# Patient Record
Sex: MALE | Race: WHITE | NOT HISPANIC OR LATINO | Employment: OTHER | URBAN - METROPOLITAN AREA
[De-identification: names, ages, dates, MRNs, and addresses within clinical notes are randomized per-mention and may not be internally consistent; named-entity substitution may affect disease eponyms.]

---

## 2023-10-16 ENCOUNTER — TRANSCRIBE ORDERS (OUTPATIENT)
Dept: CARDIAC REHAB | Facility: HOSPITAL | Age: 73
End: 2023-10-16

## 2023-10-16 DIAGNOSIS — J43.9 PULMONARY EMPHYSEMA, UNSPECIFIED EMPHYSEMA TYPE: Primary | ICD-10-CM

## 2023-10-23 ENCOUNTER — OFFICE VISIT (OUTPATIENT)
Dept: CARDIAC REHAB | Facility: HOSPITAL | Age: 73
End: 2023-10-23
Payer: MEDICARE

## 2023-10-23 DIAGNOSIS — J43.9 PULMONARY EMPHYSEMA, UNSPECIFIED EMPHYSEMA TYPE: Primary | ICD-10-CM

## 2023-10-23 PROCEDURE — 94626 PHY/QHP OP PULM RHB W/MNTR: CPT

## 2023-10-23 NOTE — PROGRESS NOTES
Patient was able to complete walk on ra without a rest stop and while maintaining oxygen saturations of 90%. Please see session report for remaining visit details. DESTINY Salazar RRT

## 2023-10-26 ENCOUNTER — TREATMENT (OUTPATIENT)
Dept: CARDIAC REHAB | Facility: HOSPITAL | Age: 73
End: 2023-10-26
Payer: MEDICARE

## 2023-10-26 DIAGNOSIS — J43.9 PULMONARY EMPHYSEMA, UNSPECIFIED EMPHYSEMA TYPE: Primary | ICD-10-CM

## 2023-10-26 PROCEDURE — 94625 PHY/QHP OP PULM RHB W/O MNTR: CPT

## 2023-10-31 ENCOUNTER — TREATMENT (OUTPATIENT)
Dept: CARDIAC REHAB | Facility: HOSPITAL | Age: 73
End: 2023-10-31
Payer: MEDICARE

## 2023-10-31 DIAGNOSIS — J43.9 PULMONARY EMPHYSEMA, UNSPECIFIED EMPHYSEMA TYPE: Primary | ICD-10-CM

## 2023-10-31 PROCEDURE — 94625 PHY/QHP OP PULM RHB W/O MNTR: CPT

## 2023-11-02 ENCOUNTER — TREATMENT (OUTPATIENT)
Dept: CARDIAC REHAB | Facility: HOSPITAL | Age: 73
End: 2023-11-02
Payer: MEDICARE

## 2023-11-02 DIAGNOSIS — J43.9 PULMONARY EMPHYSEMA, UNSPECIFIED EMPHYSEMA TYPE: Primary | ICD-10-CM

## 2023-11-02 PROCEDURE — 94625 PHY/QHP OP PULM RHB W/O MNTR: CPT

## 2023-11-07 ENCOUNTER — TREATMENT (OUTPATIENT)
Dept: CARDIAC REHAB | Facility: HOSPITAL | Age: 73
End: 2023-11-07
Payer: MEDICARE

## 2023-11-07 DIAGNOSIS — J43.9 PULMONARY EMPHYSEMA, UNSPECIFIED EMPHYSEMA TYPE: Primary | ICD-10-CM

## 2023-11-07 PROCEDURE — 94625 PHY/QHP OP PULM RHB W/O MNTR: CPT

## 2023-11-09 ENCOUNTER — TREATMENT (OUTPATIENT)
Dept: CARDIAC REHAB | Facility: HOSPITAL | Age: 73
End: 2023-11-09
Payer: MEDICARE

## 2023-11-09 DIAGNOSIS — J43.9 PULMONARY EMPHYSEMA, UNSPECIFIED EMPHYSEMA TYPE: Primary | ICD-10-CM

## 2023-11-09 PROCEDURE — 94625 PHY/QHP OP PULM RHB W/O MNTR: CPT

## 2023-11-14 ENCOUNTER — TREATMENT (OUTPATIENT)
Dept: CARDIAC REHAB | Facility: HOSPITAL | Age: 73
End: 2023-11-14
Payer: MEDICARE

## 2023-11-14 DIAGNOSIS — J43.9 PULMONARY EMPHYSEMA, UNSPECIFIED EMPHYSEMA TYPE: Primary | ICD-10-CM

## 2023-11-14 PROCEDURE — 94625 PHY/QHP OP PULM RHB W/O MNTR: CPT

## 2023-11-16 ENCOUNTER — TREATMENT (OUTPATIENT)
Dept: CARDIAC REHAB | Facility: HOSPITAL | Age: 73
End: 2023-11-16
Payer: MEDICARE

## 2023-11-16 DIAGNOSIS — J43.9 PULMONARY EMPHYSEMA, UNSPECIFIED EMPHYSEMA TYPE: Primary | ICD-10-CM

## 2023-11-16 PROCEDURE — 94625 PHY/QHP OP PULM RHB W/O MNTR: CPT

## 2023-11-21 ENCOUNTER — APPOINTMENT (OUTPATIENT)
Dept: CARDIAC REHAB | Facility: HOSPITAL | Age: 73
End: 2023-11-21
Payer: MEDICARE

## 2023-11-23 ENCOUNTER — APPOINTMENT (OUTPATIENT)
Dept: CARDIAC REHAB | Facility: HOSPITAL | Age: 73
End: 2023-11-23
Payer: MEDICARE

## 2023-11-28 ENCOUNTER — APPOINTMENT (OUTPATIENT)
Dept: CARDIAC REHAB | Facility: HOSPITAL | Age: 73
End: 2023-11-28
Payer: MEDICARE

## 2023-11-30 ENCOUNTER — APPOINTMENT (OUTPATIENT)
Dept: CARDIAC REHAB | Facility: HOSPITAL | Age: 73
End: 2023-11-30
Payer: MEDICARE

## 2024-03-11 ENCOUNTER — APPOINTMENT (OUTPATIENT)
Dept: GENERAL RADIOLOGY | Facility: HOSPITAL | Age: 74
DRG: 177 | End: 2024-03-11
Payer: OTHER GOVERNMENT

## 2024-03-11 ENCOUNTER — HOSPITAL ENCOUNTER (INPATIENT)
Facility: HOSPITAL | Age: 74
LOS: 4 days | Discharge: HOME | DRG: 177 | End: 2024-03-16
Attending: EMERGENCY MEDICINE | Admitting: HOSPITALIST
Payer: OTHER GOVERNMENT

## 2024-03-11 ENCOUNTER — APPOINTMENT (OUTPATIENT)
Dept: CT IMAGING | Facility: HOSPITAL | Age: 74
DRG: 177 | End: 2024-03-11
Payer: OTHER GOVERNMENT

## 2024-03-11 DIAGNOSIS — J18.9 MULTIFOCAL PNEUMONIA: Primary | ICD-10-CM

## 2024-03-11 DIAGNOSIS — U07.1 COVID-19: ICD-10-CM

## 2024-03-11 LAB
ALBUMIN SERPL-MCNC: 3 G/DL (ref 3.5–5.2)
ALBUMIN/GLOB SERPL: 0.9 G/DL
ALP SERPL-CCNC: 59 U/L (ref 39–117)
ALT SERPL W P-5'-P-CCNC: 9 U/L (ref 1–41)
ANION GAP SERPL CALCULATED.3IONS-SCNC: 9 MMOL/L (ref 5–15)
AST SERPL-CCNC: 15 U/L (ref 1–40)
BASOPHILS # BLD AUTO: 0 10*3/MM3 (ref 0–0.2)
BASOPHILS NFR BLD AUTO: 0.3 % (ref 0–1.5)
BILIRUB SERPL-MCNC: 0.5 MG/DL (ref 0–1.2)
BUN SERPL-MCNC: 26 MG/DL (ref 8–23)
BUN/CREAT SERPL: 27.1 (ref 7–25)
CALCIUM SPEC-SCNC: 8.3 MG/DL (ref 8.6–10.5)
CHLORIDE SERPL-SCNC: 99 MMOL/L (ref 98–107)
CK SERPL-CCNC: 145 U/L (ref 20–200)
CO2 SERPL-SCNC: 27 MMOL/L (ref 22–29)
CREAT SERPL-MCNC: 0.96 MG/DL (ref 0.76–1.27)
D-LACTATE SERPL-SCNC: 0.9 MMOL/L (ref 0.3–2)
DEPRECATED RDW RBC AUTO: 45.5 FL (ref 37–54)
EGFRCR SERPLBLD CKD-EPI 2021: 82.4 ML/MIN/1.73
EOSINOPHIL # BLD AUTO: 0 10*3/MM3 (ref 0–0.4)
EOSINOPHIL NFR BLD AUTO: 0 % (ref 0.3–6.2)
ERYTHROCYTE [DISTWIDTH] IN BLOOD BY AUTOMATED COUNT: 13.5 % (ref 12.3–15.4)
GLOBULIN UR ELPH-MCNC: 3.4 GM/DL
GLUCOSE SERPL-MCNC: 126 MG/DL (ref 65–99)
HCT VFR BLD AUTO: 36.6 % (ref 37.5–51)
HGB BLD-MCNC: 11.9 G/DL (ref 13–17.7)
LYMPHOCYTES # BLD AUTO: 0.3 10*3/MM3 (ref 0.7–3.1)
LYMPHOCYTES NFR BLD AUTO: 1.9 % (ref 19.6–45.3)
MCH RBC QN AUTO: 31.7 PG (ref 26.6–33)
MCHC RBC AUTO-ENTMCNC: 32.5 G/DL (ref 31.5–35.7)
MCV RBC AUTO: 97.6 FL (ref 79–97)
MONOCYTES # BLD AUTO: 0.7 10*3/MM3 (ref 0.1–0.9)
MONOCYTES NFR BLD AUTO: 4 % (ref 5–12)
NEUTROPHILS NFR BLD AUTO: 16.1 10*3/MM3 (ref 1.7–7)
NEUTROPHILS NFR BLD AUTO: 93.8 % (ref 42.7–76)
NRBC BLD AUTO-RTO: 0 /100 WBC (ref 0–0.2)
NT-PROBNP SERPL-MCNC: 872.3 PG/ML (ref 0–1800)
PLATELET # BLD AUTO: 222 10*3/MM3 (ref 140–450)
PMV BLD AUTO: 8.4 FL (ref 6–12)
POTASSIUM SERPL-SCNC: 4.2 MMOL/L (ref 3.5–5.2)
PROCALCITONIN SERPL-MCNC: 1.8 NG/ML (ref 0–0.25)
PROT SERPL-MCNC: 6.4 G/DL (ref 6–8.5)
RBC # BLD AUTO: 3.75 10*6/MM3 (ref 4.14–5.8)
SODIUM SERPL-SCNC: 135 MMOL/L (ref 136–145)
TROPONIN T SERPL HS-MCNC: 11 NG/L
WBC NRBC COR # BLD AUTO: 17.1 10*3/MM3 (ref 3.4–10.8)

## 2024-03-11 PROCEDURE — 84145 PROCALCITONIN (PCT): CPT | Performed by: EMERGENCY MEDICINE

## 2024-03-11 PROCEDURE — 83605 ASSAY OF LACTIC ACID: CPT

## 2024-03-11 PROCEDURE — 83880 ASSAY OF NATRIURETIC PEPTIDE: CPT | Performed by: EMERGENCY MEDICINE

## 2024-03-11 PROCEDURE — 99285 EMERGENCY DEPT VISIT HI MDM: CPT

## 2024-03-11 PROCEDURE — 82550 ASSAY OF CK (CPK): CPT | Performed by: EMERGENCY MEDICINE

## 2024-03-11 PROCEDURE — 94799 UNLISTED PULMONARY SVC/PX: CPT

## 2024-03-11 PROCEDURE — 71045 X-RAY EXAM CHEST 1 VIEW: CPT

## 2024-03-11 PROCEDURE — 0202U NFCT DS 22 TRGT SARS-COV-2: CPT | Performed by: EMERGENCY MEDICINE

## 2024-03-11 PROCEDURE — 36415 COLL VENOUS BLD VENIPUNCTURE: CPT

## 2024-03-11 PROCEDURE — 84484 ASSAY OF TROPONIN QUANT: CPT | Performed by: EMERGENCY MEDICINE

## 2024-03-11 PROCEDURE — 85025 COMPLETE CBC W/AUTO DIFF WBC: CPT | Performed by: EMERGENCY MEDICINE

## 2024-03-11 PROCEDURE — 80053 COMPREHEN METABOLIC PANEL: CPT | Performed by: EMERGENCY MEDICINE

## 2024-03-11 PROCEDURE — 93005 ELECTROCARDIOGRAM TRACING: CPT | Performed by: EMERGENCY MEDICINE

## 2024-03-11 PROCEDURE — 87040 BLOOD CULTURE FOR BACTERIA: CPT | Performed by: EMERGENCY MEDICINE

## 2024-03-11 RX ORDER — SODIUM CHLORIDE 0.9 % (FLUSH) 0.9 %
10 SYRINGE (ML) INJECTION AS NEEDED
Status: DISCONTINUED | OUTPATIENT
Start: 2024-03-11 | End: 2024-03-16 | Stop reason: HOSPADM

## 2024-03-11 RX ORDER — ALBUTEROL SULFATE 2.5 MG/3ML
2.5 SOLUTION RESPIRATORY (INHALATION) ONCE
Status: COMPLETED | OUTPATIENT
Start: 2024-03-11 | End: 2024-03-11

## 2024-03-11 RX ADMIN — ALBUTEROL SULFATE 2.5 MG: 2.5 SOLUTION RESPIRATORY (INHALATION) at 22:52

## 2024-03-12 ENCOUNTER — APPOINTMENT (OUTPATIENT)
Dept: CT IMAGING | Facility: HOSPITAL | Age: 74
DRG: 177 | End: 2024-03-12
Payer: OTHER GOVERNMENT

## 2024-03-12 PROBLEM — M19.90 ARTHRITIS: Status: ACTIVE | Noted: 2024-03-12

## 2024-03-12 PROBLEM — U07.1 COVID: Status: ACTIVE | Noted: 2024-03-12

## 2024-03-12 PROBLEM — G47.30 SLEEP APNEA: Status: ACTIVE | Noted: 2024-03-12

## 2024-03-12 PROBLEM — J18.9 MULTIFOCAL PNEUMONIA: Status: ACTIVE | Noted: 2024-03-12

## 2024-03-12 LAB
ANION GAP SERPL CALCULATED.3IONS-SCNC: 8 MMOL/L (ref 5–15)
B PARAPERT DNA SPEC QL NAA+PROBE: NOT DETECTED
B PERT DNA SPEC QL NAA+PROBE: NOT DETECTED
BACTERIA UR QL AUTO: NORMAL /HPF
BILIRUB UR QL STRIP: NEGATIVE
BUN SERPL-MCNC: 23 MG/DL (ref 8–23)
BUN/CREAT SERPL: 31.1 (ref 7–25)
C PNEUM DNA NPH QL NAA+NON-PROBE: NOT DETECTED
CALCIUM SPEC-SCNC: 8.2 MG/DL (ref 8.6–10.5)
CHLORIDE SERPL-SCNC: 99 MMOL/L (ref 98–107)
CK SERPL-CCNC: 81 U/L (ref 20–200)
CLARITY UR: CLEAR
CO2 SERPL-SCNC: 27 MMOL/L (ref 22–29)
COLOR UR: ABNORMAL
CREAT SERPL-MCNC: 0.74 MG/DL (ref 0.76–1.27)
EGFRCR SERPLBLD CKD-EPI 2021: 94.5 ML/MIN/1.73
FLUAV SUBTYP SPEC NAA+PROBE: NOT DETECTED
FLUBV RNA ISLT QL NAA+PROBE: NOT DETECTED
GEN 5 2HR TROPONIN T REFLEX: 10 NG/L
GLUCOSE SERPL-MCNC: 137 MG/DL (ref 65–99)
GLUCOSE UR STRIP-MCNC: NEGATIVE MG/DL
HADV DNA SPEC NAA+PROBE: NOT DETECTED
HCOV 229E RNA SPEC QL NAA+PROBE: NOT DETECTED
HCOV HKU1 RNA SPEC QL NAA+PROBE: NOT DETECTED
HCOV NL63 RNA SPEC QL NAA+PROBE: NOT DETECTED
HCOV OC43 RNA SPEC QL NAA+PROBE: NOT DETECTED
HGB UR QL STRIP.AUTO: NEGATIVE
HMPV RNA NPH QL NAA+NON-PROBE: NOT DETECTED
HOLD SPECIMEN: NORMAL
HPIV1 RNA ISLT QL NAA+PROBE: NOT DETECTED
HPIV2 RNA SPEC QL NAA+PROBE: NOT DETECTED
HPIV3 RNA NPH QL NAA+PROBE: NOT DETECTED
HPIV4 P GENE NPH QL NAA+PROBE: NOT DETECTED
HYALINE CASTS UR QL AUTO: NORMAL /LPF
KETONES UR QL STRIP: ABNORMAL
L PNEUMO1 AG UR QL IA: NEGATIVE
LEUKOCYTE ESTERASE UR QL STRIP.AUTO: ABNORMAL
M PNEUMO IGG SER IA-ACNC: NOT DETECTED
NITRITE UR QL STRIP: NEGATIVE
PH UR STRIP.AUTO: 5.5 [PH] (ref 5–8)
POTASSIUM SERPL-SCNC: 4.1 MMOL/L (ref 3.5–5.2)
PROT UR QL STRIP: ABNORMAL
QT INTERVAL: 377 MS
QTC INTERVAL: 456 MS
RBC # UR STRIP: NORMAL /HPF
REF LAB TEST METHOD: NORMAL
RHINOVIRUS RNA SPEC NAA+PROBE: NOT DETECTED
RSV RNA NPH QL NAA+NON-PROBE: NOT DETECTED
S PNEUM AG SPEC QL LA: NEGATIVE
SARS-COV-2 RNA NPH QL NAA+NON-PROBE: DETECTED
SODIUM SERPL-SCNC: 134 MMOL/L (ref 136–145)
SP GR UR STRIP: 1.03 (ref 1–1.03)
SQUAMOUS #/AREA URNS HPF: NORMAL /HPF
TROPONIN T DELTA: -1 NG/L
UROBILINOGEN UR QL STRIP: ABNORMAL
WBC # UR STRIP: NORMAL /HPF
WHOLE BLOOD HOLD COAG: NORMAL
WHOLE BLOOD HOLD SPECIMEN: NORMAL

## 2024-03-12 PROCEDURE — 25010000002 DEXAMETHASONE PER 1 MG: Performed by: EMERGENCY MEDICINE

## 2024-03-12 PROCEDURE — 87205 SMEAR GRAM STAIN: CPT | Performed by: NURSE PRACTITIONER

## 2024-03-12 PROCEDURE — 87899 AGENT NOS ASSAY W/OPTIC: CPT | Performed by: NURSE PRACTITIONER

## 2024-03-12 PROCEDURE — 82550 ASSAY OF CK (CPK): CPT | Performed by: NURSE PRACTITIONER

## 2024-03-12 PROCEDURE — 25010000002 CEFTRIAXONE PER 250 MG: Performed by: EMERGENCY MEDICINE

## 2024-03-12 PROCEDURE — 87077 CULTURE AEROBIC IDENTIFY: CPT | Performed by: NURSE PRACTITIONER

## 2024-03-12 PROCEDURE — 81001 URINALYSIS AUTO W/SCOPE: CPT | Performed by: EMERGENCY MEDICINE

## 2024-03-12 PROCEDURE — 87070 CULTURE OTHR SPECIMN AEROBIC: CPT | Performed by: NURSE PRACTITIONER

## 2024-03-12 PROCEDURE — 63710000001 AZATHIOPRINE PER 50 MG: Performed by: INTERNAL MEDICINE

## 2024-03-12 PROCEDURE — 87449 NOS EACH ORGANISM AG IA: CPT | Performed by: NURSE PRACTITIONER

## 2024-03-12 PROCEDURE — 63710000001 MYCOPHENOLATE MOFETIL PER 250 MG: Performed by: INTERNAL MEDICINE

## 2024-03-12 PROCEDURE — 94761 N-INVAS EAR/PLS OXIMETRY MLT: CPT

## 2024-03-12 PROCEDURE — 80048 BASIC METABOLIC PNL TOTAL CA: CPT | Performed by: NURSE PRACTITIONER

## 2024-03-12 PROCEDURE — 94640 AIRWAY INHALATION TREATMENT: CPT

## 2024-03-12 PROCEDURE — 94799 UNLISTED PULMONARY SVC/PX: CPT

## 2024-03-12 PROCEDURE — 84484 ASSAY OF TROPONIN QUANT: CPT | Performed by: EMERGENCY MEDICINE

## 2024-03-12 PROCEDURE — 25010000002 ENOXAPARIN PER 10 MG: Performed by: NURSE PRACTITIONER

## 2024-03-12 PROCEDURE — 63710000001 DEXAMETHASONE PER 0.25 MG: Performed by: NURSE PRACTITIONER

## 2024-03-12 PROCEDURE — 87186 SC STD MICRODIL/AGAR DIL: CPT | Performed by: NURSE PRACTITIONER

## 2024-03-12 PROCEDURE — 70450 CT HEAD/BRAIN W/O DYE: CPT

## 2024-03-12 RX ORDER — MYCOPHENOLATE MOFETIL 500 MG/1
TABLET ORAL 2 TIMES DAILY
COMMUNITY

## 2024-03-12 RX ORDER — AZATHIOPRINE 50 MG/1
50 TABLET ORAL 2 TIMES DAILY
Status: DISCONTINUED | OUTPATIENT
Start: 2024-03-12 | End: 2024-03-16 | Stop reason: HOSPADM

## 2024-03-12 RX ORDER — ALBUTEROL SULFATE 90 UG/1
2 AEROSOL, METERED RESPIRATORY (INHALATION)
Status: DISCONTINUED | OUTPATIENT
Start: 2024-03-12 | End: 2024-03-16 | Stop reason: HOSPADM

## 2024-03-12 RX ORDER — ALBUTEROL SULFATE 90 UG/1
2 AEROSOL, METERED RESPIRATORY (INHALATION) EVERY 6 HOURS PRN
Status: DISCONTINUED | OUTPATIENT
Start: 2024-03-12 | End: 2024-03-12

## 2024-03-12 RX ORDER — DEXAMETHASONE 6 MG/1
6 TABLET ORAL
Status: DISCONTINUED | OUTPATIENT
Start: 2024-03-12 | End: 2024-03-16 | Stop reason: HOSPADM

## 2024-03-12 RX ORDER — ALBUTEROL SULFATE 90 UG/1
2 AEROSOL, METERED RESPIRATORY (INHALATION) EVERY 4 HOURS PRN
COMMUNITY

## 2024-03-12 RX ORDER — NITROGLYCERIN 0.4 MG/1
0.4 TABLET SUBLINGUAL
Status: DISCONTINUED | OUTPATIENT
Start: 2024-03-12 | End: 2024-03-16 | Stop reason: HOSPADM

## 2024-03-12 RX ORDER — GUAIFENESIN 600 MG/1
600 TABLET, EXTENDED RELEASE ORAL EVERY 12 HOURS PRN
Status: DISCONTINUED | OUTPATIENT
Start: 2024-03-12 | End: 2024-03-16 | Stop reason: HOSPADM

## 2024-03-12 RX ORDER — AZATHIOPRINE 50 MG/1
50 TABLET ORAL 2 TIMES DAILY
COMMUNITY

## 2024-03-12 RX ORDER — NIACINAMIDE 500 MG
500 TABLET ORAL 2 TIMES DAILY WITH MEALS
COMMUNITY

## 2024-03-12 RX ORDER — IPRATROPIUM BROMIDE AND ALBUTEROL SULFATE 2.5; .5 MG/3ML; MG/3ML
3 SOLUTION RESPIRATORY (INHALATION)
Status: DISCONTINUED | OUTPATIENT
Start: 2024-03-12 | End: 2024-03-12

## 2024-03-12 RX ORDER — BENZONATATE 100 MG/1
100 CAPSULE ORAL 3 TIMES DAILY PRN
Status: DISCONTINUED | OUTPATIENT
Start: 2024-03-12 | End: 2024-03-16 | Stop reason: HOSPADM

## 2024-03-12 RX ORDER — ALBUTEROL SULFATE 2.5 MG/3ML
2.5 SOLUTION RESPIRATORY (INHALATION) EVERY 6 HOURS PRN
COMMUNITY

## 2024-03-12 RX ORDER — ENOXAPARIN SODIUM 100 MG/ML
30 INJECTION SUBCUTANEOUS DAILY
Status: DISCONTINUED | OUTPATIENT
Start: 2024-03-12 | End: 2024-03-13

## 2024-03-12 RX ORDER — CARVEDILOL 25 MG/1
25 TABLET ORAL 2 TIMES DAILY WITH MEALS
COMMUNITY

## 2024-03-12 RX ORDER — CARVEDILOL 25 MG/1
25 TABLET ORAL 2 TIMES DAILY WITH MEALS
Status: DISCONTINUED | OUTPATIENT
Start: 2024-03-12 | End: 2024-03-16 | Stop reason: HOSPADM

## 2024-03-12 RX ORDER — BUDESONIDE AND FORMOTEROL FUMARATE DIHYDRATE 160; 4.5 UG/1; UG/1
2 AEROSOL RESPIRATORY (INHALATION)
Status: DISCONTINUED | OUTPATIENT
Start: 2024-03-12 | End: 2024-03-16 | Stop reason: HOSPADM

## 2024-03-12 RX ORDER — GUAIFENESIN 600 MG/1
1200 TABLET, EXTENDED RELEASE ORAL EVERY 12 HOURS SCHEDULED
Status: DISCONTINUED | OUTPATIENT
Start: 2024-03-12 | End: 2024-03-16 | Stop reason: HOSPADM

## 2024-03-12 RX ORDER — IPRATROPIUM BROMIDE AND ALBUTEROL SULFATE 2.5; .5 MG/3ML; MG/3ML
3 SOLUTION RESPIRATORY (INHALATION) EVERY 6 HOURS PRN
Status: DISCONTINUED | OUTPATIENT
Start: 2024-03-12 | End: 2024-03-16 | Stop reason: HOSPADM

## 2024-03-12 RX ORDER — MYCOPHENOLATE MOFETIL 250 MG/1
500 CAPSULE ORAL EVERY 12 HOURS SCHEDULED
Status: DISCONTINUED | OUTPATIENT
Start: 2024-03-12 | End: 2024-03-16 | Stop reason: HOSPADM

## 2024-03-12 RX ORDER — ALBUTEROL SULFATE 2.5 MG/3ML
2.5 SOLUTION RESPIRATORY (INHALATION) EVERY 6 HOURS PRN
Status: DISCONTINUED | OUTPATIENT
Start: 2024-03-12 | End: 2024-03-16 | Stop reason: HOSPADM

## 2024-03-12 RX ORDER — FLUTICASONE PROPIONATE AND SALMETEROL 500; 50 UG/1; UG/1
POWDER RESPIRATORY (INHALATION)
COMMUNITY

## 2024-03-12 RX ORDER — BUDESONIDE AND FORMOTEROL FUMARATE DIHYDRATE 160; 4.5 UG/1; UG/1
1 AEROSOL RESPIRATORY (INHALATION)
Status: DISCONTINUED | OUTPATIENT
Start: 2024-03-12 | End: 2024-03-12 | Stop reason: SDUPTHER

## 2024-03-12 RX ORDER — LEFLUNOMIDE 20 MG/1
20 TABLET ORAL DAILY
Status: DISCONTINUED | OUTPATIENT
Start: 2024-03-12 | End: 2024-03-12

## 2024-03-12 RX ORDER — LEFLUNOMIDE 20 MG/1
20 TABLET ORAL DAILY
COMMUNITY

## 2024-03-12 RX ORDER — DEXAMETHASONE SODIUM PHOSPHATE 4 MG/ML
6 INJECTION, SOLUTION INTRA-ARTICULAR; INTRALESIONAL; INTRAMUSCULAR; INTRAVENOUS; SOFT TISSUE ONCE
Status: COMPLETED | OUTPATIENT
Start: 2024-03-12 | End: 2024-03-12

## 2024-03-12 RX ADMIN — GUAIFENESIN 1200 MG: 600 TABLET, EXTENDED RELEASE ORAL at 09:27

## 2024-03-12 RX ADMIN — AZATHIOPRINE 50 MG: 50 TABLET ORAL at 13:33

## 2024-03-12 RX ADMIN — DOXYCYCLINE 100 MG: 100 INJECTION, POWDER, LYOPHILIZED, FOR SOLUTION INTRAVENOUS at 13:32

## 2024-03-12 RX ADMIN — ALBUTEROL SULFATE 2 PUFF: 108 AEROSOL, METERED RESPIRATORY (INHALATION) at 10:58

## 2024-03-12 RX ADMIN — ALBUTEROL SULFATE 2 PUFF: 108 AEROSOL, METERED RESPIRATORY (INHALATION) at 20:29

## 2024-03-12 RX ADMIN — BUDESONIDE AND FORMOTEROL FUMARATE DIHYDRATE 2 PUFF: 160; 4.5 AEROSOL RESPIRATORY (INHALATION) at 20:32

## 2024-03-12 RX ADMIN — CARVEDILOL 25 MG: 6.25 TABLET, FILM COATED ORAL at 21:40

## 2024-03-12 RX ADMIN — ENOXAPARIN SODIUM 30 MG: 100 INJECTION SUBCUTANEOUS at 17:06

## 2024-03-12 RX ADMIN — Medication 5000 UNITS: at 09:27

## 2024-03-12 RX ADMIN — DEXAMETHASONE 6 MG: 6 TABLET ORAL at 09:27

## 2024-03-12 RX ADMIN — MYCOPHENOLATE MOFETIL 500 MG: 250 CAPSULE ORAL at 13:33

## 2024-03-12 RX ADMIN — DEXAMETHASONE SODIUM PHOSPHATE 6 MG: 4 INJECTION, SOLUTION INTRAMUSCULAR; INTRAVENOUS at 02:49

## 2024-03-12 RX ADMIN — MYCOPHENOLATE MOFETIL 500 MG: 250 CAPSULE ORAL at 20:46

## 2024-03-12 RX ADMIN — CARVEDILOL 25 MG: 6.25 TABLET, FILM COATED ORAL at 13:32

## 2024-03-12 RX ADMIN — CEFTRIAXONE 2000 MG: 2 INJECTION, POWDER, FOR SOLUTION INTRAMUSCULAR; INTRAVENOUS at 00:38

## 2024-03-12 RX ADMIN — AZATHIOPRINE 50 MG: 50 TABLET ORAL at 20:46

## 2024-03-12 RX ADMIN — DOXYCYCLINE 100 MG: 100 INJECTION, POWDER, LYOPHILIZED, FOR SOLUTION INTRAVENOUS at 00:37

## 2024-03-12 RX ADMIN — ALBUTEROL SULFATE 2 PUFF: 108 AEROSOL, METERED RESPIRATORY (INHALATION) at 06:33

## 2024-03-12 RX ADMIN — ENOXAPARIN SODIUM 30 MG: 100 INJECTION SUBCUTANEOUS at 02:55

## 2024-03-12 RX ADMIN — GUAIFENESIN 1200 MG: 600 TABLET, EXTENDED RELEASE ORAL at 20:46

## 2024-03-12 NOTE — H&P
[facility] Medicine Services  History & Physical    Patient Name: Dayne Goff  : 1950  MRN: [id number]  Primary Care Physician:  Provider, No Known  Date of admission: 3/11/2024  Date and Time of Service: 3/11/2024 at 0230    Subjective      Chief Complaint: Generalized weakness, fall at home    History of Present Illness: Dayne Goff is a 73 y.o. male with a CMH of COPD who presented to [facility] on 3/11/2024 with COVID, pneumonia.  Patient stated that he was getting out of bed and slipped on the hardwood floor and hit his head. Patient is unsure if he had LOC. Patient was too weak to get up off of the floor for at least 3 hours.  Spouse at bedside stated patient has fallen 3 times in the last month.  Patient developed a productive cough with yellow and green sputum, increased shortness of air, congestion times several days.  [facility] and he was started on azithromycin and steroids.  He denies fever but endorses chills and bodyaches.  His daughter visited from out of state at the end of February and was recently diagnosed with COVID-19.  He denies chest pain, abdominal pain, nausea, vomiting, diarrhea.  Patient stated that he wears 2 L supplemental oxygen per NC at night at home.  He is currently on 2 L per NC.    In the ED, CT head negative.  Chest x-ray showed multifocal pneumonia.  Respiratory panel showed positive COVID-19.  EKG showed sinus rhythm.  All labs unremarkable except procalcitonin 1.8, WBC 17.1.  Patient received nebulizer treatment, Rocephin, Decadron, doxycycline in the ED.  Patient admitted to hospitalist group for further evaluation and treatment.    Review of Systems   Constitutional:  Positive for chills.   HENT: Negative.     Eyes: Negative.    Respiratory:  Positive for cough and shortness of breath.    Cardiovascular: Negative.  Negative for chest pain.   Gastrointestinal: Negative.  Negative for nausea.   Endocrine: Negative.     Genitourinary: Negative.    Musculoskeletal: Negative.    Skin: Negative.    Neurological: Negative.    Psychiatric/Behavioral: Negative.         Personal History     History reviewed. No pertinent past medical history.    History reviewed. No pertinent surgical history.    Family History: family history is not on file. Otherwise pertinent FHx was reviewed and not pertinent to current issue.    Social History:  reports that he has been smoking cigarettes. He started smoking about 50 years ago. He has a 49 pack-year smoking history. He has never used smokeless tobacco. He reports that he does not use drugs.    Home Medications:  Prior to Admission Medications       None              Allergies:  Allergies   Allergen Reactions    Vancomycin Rash       Objective      Vitals:   Temp:  [98.7 °F (37.1 °C)] 98.7 °F (37.1 °C)  Heart Rate:  [80-96] 81  Resp:  [16-20] 20  BP: ()/(64-82) 99/82  Body mass index is 22.24 kg/m².  Physical Exam  Vitals and nursing note reviewed.   Constitutional:       Appearance: Normal appearance.   HENT:      Head: Normocephalic.      Right Ear: External ear normal.      Left Ear: External ear normal.      Nose: Nose normal.      Mouth/Throat:      Pharynx: Oropharynx is clear.   Eyes:      Extraocular Movements: Extraocular movements intact.   Cardiovascular:      Rate and Rhythm: Normal rate and regular rhythm.      Pulses: Normal pulses.      Heart sounds: Normal heart sounds.   Pulmonary:      Effort: Pulmonary effort is normal.      Breath sounds: Wheezing present.   Abdominal:      General: Bowel sounds are normal.      Palpations: Abdomen is soft.   Musculoskeletal:         General: Normal range of motion.      Cervical back: Normal range of motion.   Skin:     General: Skin is warm and dry.      Findings: Lesion present.      Comments: Small scattered scabs to left forearm   Neurological:      Mental Status: He is alert and oriented to person, place, and time.   Psychiatric:          Mood and Affect: Mood normal.         Behavior: Behavior normal.         Diagnostic Data:  Lab Results (last 24 hours)       Procedure Component Value Units Date/Time    Basic Metabolic Panel [[id number]] Updated: 03/12/24 0356    Specimen: Blood     CK [[id number]]  (Normal) Collected: 03/12/24 0257    Specimen: Blood Updated: 03/12/24 0338     Creatine Kinase 81 U/L     High Sensitivity Troponin T 2Hr [[id number]]  (Normal) Collected: 03/12/24 0152    Specimen: Blood Updated: 03/12/24 0216     HS Troponin T 10 ng/L      Troponin T Delta -1 ng/L     Narrative:      High Sensitive Troponin T Reference Range:  <14.0 ng/L- Negative Female for AMI  <22.0 ng/L- Negative Male for AMI  >=14 - Abnormal Female indicating possible myocardial injury.  >=22 - Abnormal Male indicating possible myocardial injury.   Clinicians would have to utilize clinical acumen, EKG, Troponin, and serial changes to determine if it is an Acute Myocardial Infarction or myocardial injury due to an underlying chronic condition.         [place] Draw [[id number]] Collected: 03/11/24 2316    Specimen: Blood Updated: 03/12/24 0030    Narrative:      The following orders were created for panel order [place] Draw.  Procedure                               Abnormality         Status                     ---------                               -----------         ------                     Green Top (Gel)[[id number]]                                  Final result               Lavender Top[[id number]]                                     Final result               Gold Top - SST[[id number]]                                   Final result               Light Blue Top[[id number]]                                   Final result                 Please view results for these tests on the individual orders.    Lavender Top [[id number]] Collected: 03/11/24 2316    Specimen: Blood Updated: 03/12/24 0030     Extra Tube hold for add-on     Comment: Auto resulted       Gold  Top - SST [[id number]] Collected: 03/11/24 2316    Specimen: Blood Updated: 03/12/24 0030     Extra Tube Hold for add-ons.     Comment: Auto resulted.       Light Blue Top [[id number]] Collected: 03/11/24 2316    Specimen: Blood Updated: 03/12/24 0030     Extra Tube Hold for add-ons.     Comment: Auto resulted       Respiratory Panel PCR w/COVID-19(SARS-CoV-2) CLARISA/KUSUM/CK/PAD/COR/KRISTEN In-House, NP Swab in UTM/VTM, 2 HR TAT - Swab, Nasopharynx [[id number]]  (Abnormal) Collected: 03/11/24 2316    Specimen: Swab from Nasopharynx Updated: 03/12/24 0017     ADENOVIRUS, PCR Not Detected     Coronavirus 229E Not Detected     Coronavirus HKU1 Not Detected     Coronavirus NL63 Not Detected     Coronavirus OC43 Not Detected     COVID19 Detected     Human Metapneumovirus Not Detected     Human Rhinovirus/Enterovirus Not Detected     Influenza A PCR Not Detected     Influenza B PCR Not Detected     Parainfluenza Virus 1 Not Detected     Parainfluenza Virus 2 Not Detected     Parainfluenza Virus 3 Not Detected     Parainfluenza Virus 4 Not Detected     RSV, PCR Not Detected     Bordetella pertussis pcr Not Detected     Bordetella parapertussis PCR Not Detected     Chlamydophila pneumoniae PCR Not Detected     Mycoplasma pneumo by PCR Not Detected    Narrative:      In the setting of a positive respiratory panel with a viral infection PLUS a negative procalcitonin without other underlying concern for bacterial infection, consider observing off antibiotics or discontinuation of antibiotics and continue supportive care. If the respiratory panel is positive for atypical bacterial infection (Bordetella pertussis, Chlamydophila pneumoniae, or Mycoplasma pneumoniae), consider antibiotic de-escalation to target atypical bacterial infection.    Green Top (Gel) [[id number]] Collected: 03/11/24 2316    Specimen: Blood Updated: 03/12/24 0000    Blood Culture - Blood, Arm, Right [[id number]] Collected: 03/11/24 4633    Specimen: Blood from Arm,  "Right Updated: 03/11/24 2357    Procalcitonin [[id number]]  (Abnormal) Collected: 03/11/24 2316    Specimen: Blood Updated: 03/11/24 2352     Procalcitonin 1.80 ng/mL     Narrative:      As a Marker for Sepsis (Non-Neonates):    1. <0.5 ng/mL represents a low risk of severe sepsis and/or septic shock.  2. >2 ng/mL represents a high risk of severe sepsis and/or septic shock.    As a Marker for Lower Respiratory Tract Infections that require antibiotic therapy:    PCT on Admission    Antibiotic Therapy       6-12 Hrs later    >0.5                Strongly Recommended  >0.25 - <0.5        Recommended   0.1 - 0.25          Discouraged              Remeasure/reassess PCT  <0.1                Strongly Discouraged     Remeasure/reassess PCT    As 28 day mortality risk marker: \"Change in Procalcitonin Result\" (>80% or <=80%) if Day 0 (or Day 1) and Day 4 values are available. Refer to http://[email or web address]pct-calculator.[email or web address]    Change in PCT <=80%  A decrease of PCT levels below or equal to 80% defines a positive change in PCT test result representing a higher risk for 28-day all-cause mortality of patients diagnosed with severe sepsis for septic shock.    Change in PCT >80%  A decrease of PCT levels of more than 80% defines a negative change in PCT result representing a lower risk for 28-day all-cause mortality of patients diagnosed with severe sepsis or septic shock.       Comprehensive Metabolic Panel [[id number]]  (Abnormal) Collected: 03/11/24 2316    Specimen: Blood Updated: 03/11/24 2349     Glucose 126 mg/dL      BUN 26 mg/dL      Creatinine 0.96 mg/dL      Sodium 135 mmol/L      Potassium 4.2 mmol/L      Chloride 99 mmol/L      CO2 27.0 mmol/L      Calcium 8.3 mg/dL      Total Protein 6.4 g/dL      Albumin 3.0 g/dL      ALT (SGPT) 9 U/L      AST (SGOT) 15 U/L      Alkaline Phosphatase 59 U/L      Total Bilirubin 0.5 mg/dL      Globulin 3.4 gm/dL      A/G Ratio 0.9 g/dL      BUN/Creatinine Ratio 27.1     Anion Gap 9.0 " mmol/L      eGFR 82.4 mL/min/1.73     Narrative:      GFR Normal >60  Chronic Kidney Disease <60  Kidney Failure <15    The GFR formula is only valid for adults with stable renal function between ages 18 and 70.    High Sensitivity Troponin T [[id number]]  (Normal) Collected: 03/11/24 2316    Specimen: Blood Updated: 03/11/24 2349     HS Troponin T 11 ng/L     Narrative:      High Sensitive Troponin T Reference Range:  <14.0 ng/L- Negative Female for AMI  <22.0 ng/L- Negative Male for AMI  >=14 - Abnormal Female indicating possible myocardial injury.  >=22 - Abnormal Male indicating possible myocardial injury.   Clinicians would have to utilize clinical acumen, EKG, Troponin, and serial changes to determine if it is an Acute Myocardial Infarction or myocardial injury due to an underlying chronic condition.         BNP [[id number]]  (Normal) Collected: 03/11/24 2316    Specimen: Blood Updated: 03/11/24 2349     proBNP 872.3 pg/mL     Narrative:      This assay is used as an aid in the diagnosis of individuals suspected of having heart failure. It can be used as an aid in the diagnosis of acute decompensated heart failure (ADHF) in patients presenting with signs and symptoms of ADHF to the emergency department (ED). In addition, NT-proBNP of <300 pg/mL indicates ADHF is not likely.    Age Range Result Interpretation  NT-proBNP Concentration (pg/mL:      <50             Positive            >450                   Gray                 300-450                    Negative             <300    50-75           Positive            >900                  Gray                300-900                  Negative            <300      >75             Positive            >1800                  Gray                300-1800                  Negative            <300    CK [[id number]]  (Normal) Collected: 03/11/24 2316    Specimen: Blood Updated: 03/11/24 2349     Creatine Kinase 145 U/L     POC Lactate [[id number]]  (Normal) Collected:  03/11/24 2320    Specimen: Blood Updated: 03/11/24 2321     Lactate 0.9 mmol/L      Comment: Serial Number: [id number]:  [id number]       CBC & Differential [[id number]]  (Abnormal) Collected: 03/11/24 2316    Specimen: Blood Updated: 03/11/24 2320    Narrative:      The following orders were created for panel order CBC & Differential.  Procedure                               Abnormality         Status                     ---------                               -----------         ------                     CBC Auto Differential[[id number]]        Abnormal            Final result                 Please view results for these tests on the individual orders.    CBC Auto Differential [[id number]]  (Abnormal) Collected: 03/11/24 2316    Specimen: Blood Updated: 03/11/24 2320     WBC 17.10 10*3/mm3      RBC 3.75 10*6/mm3      Hemoglobin 11.9 g/dL      Hematocrit 36.6 %      MCV 97.6 fL      MCH 31.7 pg      MCHC 32.5 g/dL      RDW 13.5 %      RDW-SD 45.5 fl      MPV 8.4 fL      Platelets 222 10*3/mm3      Neutrophil % 93.8 %      Lymphocyte % 1.9 %      Monocyte % 4.0 %      Eosinophil % 0.0 %      Basophil % 0.3 %      Neutrophils, Absolute 16.10 10*3/mm3      Lymphocytes, Absolute 0.30 10*3/mm3      Monocytes, Absolute 0.70 10*3/mm3      Eosinophils, Absolute 0.00 10*3/mm3      Basophils, Absolute 0.00 10*3/mm3      nRBC 0.0 /100 WBC     Blood Culture - Blood, Arm, Right [[id number]] Collected: 03/11/24 2316    Specimen: Blood from Arm, Right Updated: 03/11/24 2317             Imaging Results (Last 24 Hours)       Procedure Component Value Units Date/Time    CT Head Without Contrast [[id number]] Collected: 03/12/24 0149     Updated: 03/12/24 0151    Narrative:      CT HEAD WO CONTRAST    Date of Exam: 3/12/2024 1:44 AM EDT    Indication: trauma.    Comparison: 11/1/2018.    Technique: Axial CT images were obtained of the head without contrast administration.  Coronal reconstructions were performed.  Automated exposure  control and iterative reconstruction methods were used.      Findings:  There is no evidence of hemorrhage. There is no mass effect or midline shift.    There is no extracerebral collection.    Ventricles are normal in size and configuration for patient's stated age.      Posterior fossa is within normal limits.    Calvarium and skull base appear intact.   Visualized sinuses show no air fluid levels. Visualized orbits are unremarkable.      Impression:      Impression:  No acute intracranial process identified.        Electronically Signed: Bibiana Abad MD    3/12/2024 1:49 AM EDT    Workstation ID: [id number]    XR Chest 1 View [[id number]] Collected: 03/11/24 2340     Updated: 03/11/24 2343    Narrative:      XR CHEST 1 VW    Date of Exam: 3/11/2024 11:32 PM EDT    Indication: soa    Comparison: 11/4/2018.    Findings:  The heart appears mildly enlarged, stable as compared to the previous study. No sizable pleural effusion identified. Patchy airspace disease is seen within the left upper lobe and the bilateral mid lung region. Some of this is related to scarring though   increased as compared to the previous study. New patchy airspace T8 disease is also present within the right upper lobe. No pneumothorax. Emphysematous changes are present.      Impression:      Impression:  Multifocal pneumonia. Follow-up to resolution is recommended to exclude underlying malignancy.     Electronically Signed: Bibiana Abad MD    3/11/2024 11:41 PM EDT    Workstation ID: [id number]              Assessment & Plan        This is a 73 y.o. male with:    Active and Resolved Problems  Active Hospital Problems    Diagnosis  POA    **COVID [U07.1]  Yes    Multifocal pneumonia [J18.9]  Unknown      Resolved Hospital Problems   No resolved problems to display.     Pneumonia  COVID-19  COPD exacerbation  -Imaging reviewed  -Procalcitonin 1.8, WBC 17.1  -Lactic acid WNL  -Rocephin and doxycycline given in the ED, continue  -Urine for strep pneumo  and Legionella ordered  -Blood cultures   -Sputum culture  -Nebulizer treatments, steroids, cough meds ordered  -Consider pulmonology consult    DVT prophylaxis:  Medical DVT prophylaxis orders are present.    The patient desires to be as follows:    CODE STATUS:    Code Status (Patient has no pulse and is not breathing): CPR (Attempt to Resuscitate)  Medical Interventions (Patient has pulse or is breathing): Full Support    Admission Status:  I believe this patient meets inpatient status.    Expected Length of Stay: greater than 2 nights    PDMP and Medication Dispenses via Sidebar reviewed and consistent with patient reported medications.    I discussed the patient's findings and my recommendations with patient and family.      Signature:     This document has been electronically signed by SUNITA Maguire on March 12, 2024 04:12 EDT   [facility] Hospitalist Team

## 2024-03-12 NOTE — CASE MANAGEMENT/SOCIAL WORK
Discharge Planning Assessment   Carlos     Patient Name: Dayne Goff  MRN: [id number]  Today's Date: 3/12/2024    Admit Date: 3/11/2024    Plan: DC PLAN: From routine home with wife. Current 02 2L at night. PT eval pending.       Discharge Needs Assessment       Row Name 03/12/24 1241       Living Environment    People in Home spouse    Current Living Arrangements home    Potentially Unsafe Housing Conditions none    In the past 12 months has the electric, gas, oil, or water company threatened to shut off services in your home? No    Primary Care Provided by self    Provides Primary Care For no one    Family Caregiver if Needed spouse    Quality of Family Relationships helpful;involved;supportive    Able to Return to Prior Arrangements yes       Resource/Environmental Concerns    Resource/Environmental Concerns none    Transportation Concerns none       Transportation Needs    In the past 12 months, has lack of transportation kept you from medical appointments or from getting medications? no    In the past 12 months, has lack of transportation kept you from meetings, work, or from getting things needed for daily living? No       Food Insecurity    Within the past 12 months, you worried that your food would run out before you got the money to buy more. Never true    Within the past 12 months, the food you bought just didn't last and you didn't have money to get more. Never true       Transition Planning    Patient/Family Anticipates Transition to home with family    Patient/Family Anticipated Services at Transition none    Transportation Anticipated car, drives self;family or friend will provide       Discharge Needs Assessment    Readmission Within the Last 30 Days no previous admission in last 30 days    Equipment Currently Used at Home bp cuff;oxygen;respiratory supplies    Anticipated Changes Related to Illness none    Equipment Needed After Discharge none                   Discharge Plan       Row Name  03/12/24 1241       Plan    Plan DC PLAN: From routine home with wife. Current 02 2L at night. PT eval pending.    Plan Comments Met with patient at bedside, from routine home with wife. Independent with ADL's no DME other than [facility]L [facility] Agreeagable to use [facility]. Able to afford medications and denies any issues with food or utilities. Denies any transportation issues, still drives and wife will provide at time of discharge. Denies any HHC or SNF needs, but pending PT eval. Denies any concerns about return home.                  Continued Care and Services - Admitted Since 3/11/2024    No active coordination exists for this encounter.       Expected Discharge Date and Time       Expected Discharge Date Expected Discharge Time    Mar 13, 2024            Demographic Summary       Row Name 03/12/24 1240       General Information    Admission Type inpatient    Arrived From emergency department    Required Notices Provided Important Message from Medicare    Referral Source admission list    Reason for Consult discharge planning    Preferred Language English       Contact Information    Permission Granted to Share Info With     Contact Information Obtained for                    Functional Status       Row Name 03/12/24 1241       Functional Status    Usual Activity Tolerance excellent    Current Activity Tolerance excellent       Physical Activity    On average, how many days per week do you engage in moderate to strenuous exercise (like a brisk walk)? 0 days    On average, how many minutes do you engage in exercise at this level? 0 min    Number of minutes of exercise per week 0       Assessment of Health Literacy    How often do you have someone help you read hospital materials? Sometimes    How often do you have problems learning about your medical condition because of difficulty understanding written information? Sometimes    How  often do you have a problem understanding what is told to you about your medical condition? Sometimes    How confident are you filling out medical forms by yourself? Somewhat    Health Literacy Moderate       Functional Status, IADL    Medications independent    Meal Preparation independent    Housekeeping independent    Laundry independent    Shopping independent       Mental Status    General Appearance WDL WDL       Mental Status Summary    Recent Changes in Mental Status/Cognitive Functioning no changes                    Met with patient in room wearing PPE: mask, face shield/goggles, gloves, gown.      Maintained distance greater than six feet and spent less than 15 minutes in the room.  Flory Myers, DARIUSZ   Case Management

## 2024-03-12 NOTE — PROGRESS NOTES
[facility] Medicine Services  History & Physical     Patient Name: Dayne Goff  : 1950  MRN: [id number]  Primary Care Physician:  Provider, No Known  Date of admission: 3/11/2024  Date and Time of Service: 3/11/2024 at 0230     Subjective       Chief Complaint: Generalized weakness, fall at home     History of Present Illness: Dayne Goff is a 73 y.o. male with a CMH of COPD who presented to [facility] on 3/11/2024 with COVID, pneumonia.  Patient stated that he was getting out of bed and slipped on the hardwood floor and hit his head. Patient is unsure if he had LOC. Patient was too weak to get up off of the floor for at least 3 hours.  Spouse at bedside stated patient has fallen 3 times in the last month.  Patient developed a productive cough with yellow and green sputum, increased shortness of air, congestion times several days.  [facility] and he was started on azithromycin and steroids.  He denies fever but endorses chills and bodyaches.  His daughter visited from out of state at the end of February and was recently diagnosed with COVID-19.  He denies chest pain, abdominal pain, nausea, vomiting, diarrhea.  Patient stated that he wears 2 L supplemental oxygen per NC at night at home.  He is currently on 2 L per NC.     In the ED, CT head negative.  Chest x-ray showed multifocal pneumonia.  Respiratory panel showed positive COVID-19.  EKG showed sinus rhythm.  All labs unremarkable except procalcitonin 1.8, WBC 17.1.  Patient received nebulizer treatment, Rocephin, Decadron, doxycycline in the ED.  Patient admitted to hospitalist group for further evaluation and treatment.     Review of Systems   Constitutional:  Positive for chills.   HENT: Negative.     Eyes: Negative.    Respiratory:  Positive for cough and shortness of breath.    Cardiovascular: Negative.  Negative for chest pain.   Gastrointestinal: Negative.  Negative for nausea.   Endocrine: Negative.     Genitourinary: Negative.    Musculoskeletal: Negative.    Skin: Negative.    Neurological: Negative.    Psychiatric/Behavioral: Negative.       3/12  Patient admitted early this morning  Patient had a fall event  On room air right now  Patient with COPD with 6 COVID and pneumonia on the chest x-ray  Pulmonary will be consulted for further recommendation        Personal History      Medical History   History reviewed. No pertinent past medical history.        Surgical History   History reviewed. No pertinent surgical history.        Family History: family history is not on file. Otherwise pertinent FHx was reviewed and not pertinent to current issue.     Social History:  reports that he has been smoking cigarettes. He started smoking about 50 years ago. He has a 49 pack-year smoking history. He has never used smokeless tobacco. He reports that he does not use drugs.     Home Medications:  Prior to Admission Medications         None                   Allergies:  Allergies        Allergies   Allergen Reactions    Vancomycin Rash            Objective       Vitals:   Temp:  [98.7 °F (37.1 °C)] 98.7 °F (37.1 °C)  Heart Rate:  [80-96] 81  Resp:  [16-20] 20  BP: ()/(64-82) 99/82  Body mass index is 22.24 kg/m².  Physical Exam  Vitals and nursing note reviewed.   Constitutional:       Appearance: Normal appearance.   HENT:      Head: Normocephalic.      Right Ear: External ear normal.      Left Ear: External ear normal.      Nose: Nose normal.      Mouth/Throat:      Pharynx: Oropharynx is clear.   Eyes:      Extraocular Movements: Extraocular movements intact.   Cardiovascular:      Rate and Rhythm: Normal rate and regular rhythm.      Pulses: Normal pulses.      Heart sounds: Normal heart sounds.   Pulmonary:      Effort: Pulmonary effort is normal.      Breath sounds: Wheezing present.   Abdominal:      General: Bowel sounds are normal.      Palpations: Abdomen is soft.   Musculoskeletal:         General: Normal  range of motion.      Cervical back: Normal range of motion.   Skin:     General: Skin is warm and dry.      Findings: Lesion present.      Comments: Small scattered scabs to left forearm   Neurological:      Mental Status: He is alert and oriented to person, place, and time.   Psychiatric:         Mood and Affect: Mood normal.         Behavior: Behavior normal.            Diagnostic Data:  Lab Results (last 24 hours)         Procedure Component Value Units Date/Time     Basic Metabolic Panel [[id number]] Updated: 03/12/24 0356     Specimen: Blood       CK [[id number]]  (Normal) Collected: 03/12/24 0257     Specimen: Blood Updated: 03/12/24 0338       Creatine Kinase 81 U/L       High Sensitivity Troponin T 2Hr [[id number]]  (Normal) Collected: 03/12/24 0152     Specimen: Blood Updated: 03/12/24 0216       HS Troponin T 10 ng/L         Troponin T Delta -1 ng/L       Narrative:       High Sensitive Troponin T Reference Range:  <14.0 ng/L- Negative Female for AMI  <22.0 ng/L- Negative Male for AMI  >=14 - Abnormal Female indicating possible myocardial injury.  >=22 - Abnormal Male indicating possible myocardial injury.   Clinicians would have to utilize clinical acumen, EKG, Troponin, and serial changes to determine if it is an Acute Myocardial Infarction or myocardial injury due to an underlying chronic condition.           [place] Draw [[id number]] Collected: 03/11/24 2316     Specimen: Blood Updated: 03/12/24 0030     Narrative:       The following orders were created for panel order [place] Draw.  Procedure                               Abnormality         Status                     ---------                               -----------         ------                     Green Top (Gel)[[id number]]                                  Final result               Lavender Top[[id number]]                                     Final result               Gold Top - SST[[id number]]                                   Final result                Light Blue Top[[id number]]                                   Final result                  Please view results for these tests on the individual orders.     Lavender Top [[id number]] Collected: 03/11/24 2316     Specimen: Blood Updated: 03/12/24 0030       Extra Tube hold for add-on       Comment: Auto resulted        Gold Top - SST [[id number]] Collected: 03/11/24 2316     Specimen: Blood Updated: 03/12/24 0030       Extra Tube Hold for add-ons.       Comment: Auto resulted.        Light Blue Top [[id number]] Collected: 03/11/24 2316     Specimen: Blood Updated: 03/12/24 0030       Extra Tube Hold for add-ons.       Comment: Auto resulted        Respiratory Panel PCR w/COVID-19(SARS-CoV-2) CLARISA/KUSUM/CK/PAD/COR/KRISTEN In-House, NP Swab in UTM/VTM, 2 HR TAT - Swab, Nasopharynx [[id number]]  (Abnormal) Collected: 03/11/24 2316     Specimen: Swab from Nasopharynx Updated: 03/12/24 0017       ADENOVIRUS, PCR Not Detected       Coronavirus 229E Not Detected       Coronavirus HKU1 Not Detected       Coronavirus NL63 Not Detected       Coronavirus OC43 Not Detected       COVID19 Detected       Human Metapneumovirus Not Detected       Human Rhinovirus/Enterovirus Not Detected       Influenza A PCR Not Detected       Influenza B PCR Not Detected       Parainfluenza Virus 1 Not Detected       Parainfluenza Virus 2 Not Detected       Parainfluenza Virus 3 Not Detected       Parainfluenza Virus 4 Not Detected       RSV, PCR Not Detected       Bordetella pertussis pcr Not Detected       Bordetella parapertussis PCR Not Detected       Chlamydophila pneumoniae PCR Not Detected       Mycoplasma pneumo by PCR Not Detected     Narrative:       In the setting of a positive respiratory panel with a viral infection PLUS a negative procalcitonin without other underlying concern for bacterial infection, consider observing off antibiotics or discontinuation of antibiotics and continue supportive care. If the respiratory panel is positive for  "atypical bacterial infection (Bordetella pertussis, Chlamydophila pneumoniae, or Mycoplasma pneumoniae), consider antibiotic de-escalation to target atypical bacterial infection.     Green Top (Gel) [[id number]] Collected: 03/11/24 2316     Specimen: Blood Updated: 03/12/24 0000     Blood Culture - Blood, Arm, Right [[id number]] Collected: 03/11/24 2354     Specimen: Blood from Arm, Right Updated: 03/11/24 2357     Procalcitonin [[id number]]  (Abnormal) Collected: 03/11/24 2316     Specimen: Blood Updated: 03/11/24 2352       Procalcitonin 1.80 ng/mL       Narrative:       As a Marker for Sepsis (Non-Neonates):     1. <0.5 ng/mL represents a low risk of severe sepsis and/or septic shock.  2. >2 ng/mL represents a high risk of severe sepsis and/or septic shock.     As a Marker for Lower Respiratory Tract Infections that require antibiotic therapy:     PCT on Admission    Antibiotic Therapy       6-12 Hrs later     >0.5                Strongly Recommended  >0.25 - <0.5        Recommended   0.1 - 0.25          Discouraged              Remeasure/reassess PCT  <0.1                Strongly Discouraged     Remeasure/reassess PCT     As 28 day mortality risk marker: \"Change in Procalcitonin Result\" (>80% or <=80%) if Day 0 (or Day 1) and Day 4 values are available. Refer to http://www.[facility]-pct-calculator.com     Change in PCT <=80%  A decrease of PCT levels below or equal to 80% defines a positive change in PCT test result representing a higher risk for 28-day all-cause mortality of patients diagnosed with severe sepsis for septic shock.     Change in PCT >80%  A decrease of PCT levels of more than 80% defines a negative change in PCT result representing a lower risk for 28-day all-cause mortality of patients diagnosed with severe sepsis or septic shock.         Comprehensive Metabolic Panel [[id number]]  (Abnormal) Collected: 03/11/24 2316     Specimen: Blood Updated: 03/11/24 2349       Glucose 126 mg/dL         BUN 26 " mg/dL         Creatinine 0.96 mg/dL         Sodium 135 mmol/L         Potassium 4.2 mmol/L         Chloride 99 mmol/L         CO2 27.0 mmol/L         Calcium 8.3 mg/dL         Total Protein 6.4 g/dL         Albumin 3.0 g/dL         ALT (SGPT) 9 U/L         AST (SGOT) 15 U/L         Alkaline Phosphatase 59 U/L         Total Bilirubin 0.5 mg/dL         Globulin 3.4 gm/dL         A/G Ratio 0.9 g/dL         BUN/Creatinine Ratio 27.1       Anion Gap 9.0 mmol/L         eGFR 82.4 mL/min/1.73       Narrative:       GFR Normal >60  Chronic Kidney Disease <60  Kidney Failure <15     The GFR formula is only valid for adults with stable renal function between ages 18 and 70.     High Sensitivity Troponin T [[id number]]  (Normal) Collected: 03/11/24 2316     Specimen: Blood Updated: 03/11/24 2349       HS Troponin T 11 ng/L       Narrative:       High Sensitive Troponin T Reference Range:  <14.0 ng/L- Negative Female for AMI  <22.0 ng/L- Negative Male for AMI  >=14 - Abnormal Female indicating possible myocardial injury.  >=22 - Abnormal Male indicating possible myocardial injury.   Clinicians would have to utilize clinical acumen, EKG, Troponin, and serial changes to determine if it is an Acute Myocardial Infarction or myocardial injury due to an underlying chronic condition.           BNP [[id number]]  (Normal) Collected: 03/11/24 2316     Specimen: Blood Updated: 03/11/24 2349       proBNP 872.3 pg/mL       Narrative:       This assay is used as an aid in the diagnosis of individuals suspected of having heart failure. It can be used as an aid in the diagnosis of acute decompensated heart failure (ADHF) in patients presenting with signs and symptoms of ADHF to the emergency department (ED). In addition, NT-proBNP of <300 pg/mL indicates ADHF is not likely.     Age Range        Result Interpretation  NT-proBNP Concentration (pg/mL:        <50             Positive            >450                        Reddy                         300-450                          Negative                        <300     50-75           Positive            >900                  Gray                300-900                  Negative            <300        >75             Positive            >1800                  Gray                300-1800                  Negative            <300     CK [[id number]]  (Normal) Collected: 03/11/24 2316     Specimen: Blood Updated: 03/11/24 2349       Creatine Kinase 145 U/L       POC Lactate [[id number]]  (Normal) Collected: 03/11/24 2320     Specimen: Blood Updated: 03/11/24 2321       Lactate 0.9 mmol/L         Comment: Serial Number: [id number]:  [id number]        CBC & Differential [[id number]]  (Abnormal) Collected: 03/11/24 2316     Specimen: Blood Updated: 03/11/24 2320     Narrative:       The following orders were created for panel order CBC & Differential.  Procedure                               Abnormality         Status                     ---------                               -----------         ------                     CBC Auto Differential[[id number]]        Abnormal            Final result                  Please view results for these tests on the individual orders.     CBC Auto Differential [[id number]]  (Abnormal) Collected: 03/11/24 2316     Specimen: Blood Updated: 03/11/24 2320       WBC 17.10 10*3/mm3         RBC 3.75 10*6/mm3         Hemoglobin 11.9 g/dL         Hematocrit 36.6 %         MCV 97.6 fL         MCH 31.7 pg         MCHC 32.5 g/dL         RDW 13.5 %         RDW-SD 45.5 fl         MPV 8.4 fL         Platelets 222 10*3/mm3         Neutrophil % 93.8 %         Lymphocyte % 1.9 %         Monocyte % 4.0 %         Eosinophil % 0.0 %         Basophil % 0.3 %         Neutrophils, Absolute 16.10 10*3/mm3         Lymphocytes, Absolute 0.30 10*3/mm3         Monocytes, Absolute 0.70 10*3/mm3         Eosinophils, Absolute 0.00 10*3/mm3         Basophils, Absolute 0.00 10*3/mm3         nRBC 0.0 /100  WBC       Blood Culture - Blood, Arm, Right [[id number]] Collected: 03/11/24 2316     Specimen: Blood from Arm, Right Updated: 03/11/24 2317                           Imaging Results (Last 24 Hours)         Procedure Component Value Units Date/Time     CT Head Without Contrast [[id number]] Collected: 03/12/24 0149       Updated: 03/12/24 0151     Narrative:       CT HEAD WO CONTRAST     Date of Exam: 3/12/2024 1:44 AM EDT     Indication: trauma.     Comparison: 11/1/2018.     Technique: Axial CT images were obtained of the head without contrast administration.  Coronal reconstructions were performed.  Automated exposure control and iterative reconstruction methods were used.        Findings:  There is no evidence of hemorrhage. There is no mass effect or midline shift.     There is no extracerebral collection.     Ventricles are normal in size and configuration for patient's stated age.       Posterior fossa is within normal limits.     Calvarium and skull base appear intact.   Visualized sinuses show no air fluid levels. Visualized orbits are unremarkable.        Impression:       Impression:  No acute intracranial process identified.           Electronically Signed: Bibiana Abad MD    3/12/2024 1:49 AM EDT    Workstation ID: [id number]     XR Chest 1 View [[id number]] Collected: 03/11/24 2340       Updated: 03/11/24 2343     Narrative:       XR CHEST 1 VW     Date of Exam: 3/11/2024 11:32 PM EDT     Indication: soa     Comparison: 11/4/2018.     Findings:  The heart appears mildly enlarged, stable as compared to the previous study. No sizable pleural effusion identified. Patchy airspace disease is seen within the left upper lobe and the bilateral mid lung region. Some of this is related to scarring though   increased as compared to the previous study. New patchy airspace T8 disease is also present within the right upper lobe. No pneumothorax. Emphysematous changes are present.        Impression:        Impression:  Multifocal pneumonia. Follow-up to resolution is recommended to exclude underlying malignancy.      Electronically Signed: Bibiana Abad MD    3/11/2024 11:41 PM EDT    Workstation ID: [id number]                   Assessment & Plan          This is a 73 y.o. male with:     Active and Resolved Problems        Active Hospital Problems     Diagnosis   POA    **COVID [U07.1]   Yes    Multifocal pneumonia [J18.9]   Unknown       Resolved Hospital Problems   No resolved problems to display.      Pneumonia  COVID-19  COPD exacerbation  -Imaging reviewed  -Procalcitonin 1.8, WBC 17.1  -Lactic acid WNL  -Rocephin and doxycycline given in the ED, continue  -Urine for strep pneumo and Legionella ordered  -Blood cultures   -Sputum culture  -Nebulizer treatments, steroids, cough meds ordered  -Consider pulmonology consult     DVT prophylaxis:  Medical DVT prophylaxis orders are present.     The patient desires to be as follows:     CODE STATUS:    Code Status (Patient has no pulse and is not breathing): CPR (Attempt to Resuscitate)  Medical Interventions (Patient has pulse or is breathing): Full Support     Admission Status:  I believe this patient meets inpatient status.     Expected Length of Stay: greater than 2 nights     PDMP and Medication Dispenses via Sidebar reviewed and consistent with patient reported medications.     I discussed the patient's findings and my recommendations with patient and family.

## 2024-03-12 NOTE — CONSULTS
[facility]: [facility]         CONSULT NOTE    Patient Identification:  Dayne Goff  73 y.o.  male  1950  [id number]            Requesting physician: Attending physician    Reason for Consultation: COVID-pneumonia, COPD      History of Present Illness:  73-year-old male with history of COPD and chronic hypoxemia on 2 L of oxygen at home who presented 3/11/2024 after sustaining a fall at home.  He reports productive cough for several days with the sputum color being yellow to green, shortness of breath and congestion.  He was treated in an outside facility with oral azithromycin and steroids.  Daughter was reported to have recent diagnosis of COVID-19.  Patient is afebrile and hemodynamically stable, oxygen saturation 92% on 2 L of oxygen.  WBC 17.1, hemoglobin 11.9, procalcitonin 1.8.  Chest x-ray shows multifocal pneumonia    Assessment:  COVID-19 infection  Multifocal pneumonia  COPD with acute exacerbation  Chronic hypoxemia  Leukocytosis    Chest x-ray 3/11/2024 shows multifocal pneumonia, CT scan from 2018 shows severe emphysema and scarring in the lower lobes bilaterally.      Recommendations:  Antibiotics: Rocephin  Oxygen supplement and titration to maintain saturation 90 to 95%: Currently requiring 2 L per nasal cannula  Bronchodilators  Inhaled corticosteroids: Symbicort  Mucinex  Vitamin D  DVT prophylaxis    I personally reviewed the radiological studies      Review of Sytems:  Constitutional: Positive for for chills, and negative fever and positive for malaise/fatigue.   HENT: Negative.    Eyes: Negative.    Cardiovascular: Negative.    Respiratory: Positive for cough and shortness of breath.    Skin: Negative.    Musculoskeletal: Negative.    Gastrointestinal: Negative.    Genitourinary: Negative.    Neurological: Negative.    Psychiatric/Behavioral: Negative.    Past Medical History:  History reviewed. No pertinent past medical history.    Past Surgical History:  History reviewed.  "No pertinent surgical history.     Home Meds:  (Not in a hospital admission)      Allergies:  Allergies   Allergen Reactions    Vancomycin Rash       Social History:   Social History     Socioeconomic History    Marital status:    Tobacco Use    Smoking status: Some Days     Current packs/day: 0.25     Average packs/day: 1 pack/day for 49.2 years (49.0 ttl pk-yrs)     Types: Cigarettes     Start date: 1974     Last attempt to quit: 2023    Smokeless tobacco: Never   Vaping Use    Vaping status: Never Used   Substance and Sexual Activity    Drug use: Never    Sexual activity: Defer       Family History:  History reviewed. No pertinent family history.    Physical Exam:  BP 95/60   Pulse 81   Temp 98.7 °F (37.1 °C) (Oral)   Resp 20   Ht 177.8 cm (70\")   Wt 70.3 kg (155 lb)   SpO2 92%   BMI 22.24 kg/m²  Body mass index is 22.24 kg/m². 92% 70.3 kg (155 lb)  General Appearance:  Alert   HEENT:  Normocephalic, without obvious abnormality, Conjunctiva/corneas clear,.   Nares normal, no drainage     Neck:  Supple, symmetrical, trachea midline. No JVD.  Lungs /Chest wall:   Bilateral basal rhonchi, respirations unlabored, symmetrical wall movement.     Heart:  Regular rate and rhythm, S1 S2 normal  Abdomen: Soft, non-tender, no masses, no organomegaly.    Extremities: No edema, no clubbing or cyanosis    LABS:  Lab Results   Component Value Date    CALCIUM 8.3 (L) 03/11/2024     Results from last 7 days   Lab Units 03/11/24  2316   SODIUM mmol/L 135*   POTASSIUM mmol/L 4.2   CHLORIDE mmol/L 99   CO2 mmol/L 27.0   BUN mg/dL 26*   CREATININE mg/dL 0.96   GLUCOSE mg/dL 126*   CALCIUM mg/dL 8.3*   WBC 10*3/mm3 17.10*   HEMOGLOBIN g/dL 11.9*   PLATELETS 10*3/mm3 222   ALT (SGPT) U/L 9   AST (SGOT) U/L 15   PROBNP pg/mL 872.3   PROCALCITONIN ng/mL 1.80*     Lab Results   Component Value Date    CKTOTAL 81 03/12/2024    TROPONINT 10 03/12/2024     Results from last 7 days   Lab Units 03/12/24  0257 03/12/24  0152 " "03/11/24 2316   CK TOTAL U/L 81  --  145   HSTROP T ng/L  --  10 11         Results from last 7 days   Lab Units 03/11/24  2320 03/11/24 2316   PROCALCITONIN ng/mL  --  1.80*   LACTATE mmol/L 0.9  --          Results from last 7 days   Lab Units 03/11/24  2316   ADENOVIRUS DETECTION BY PCR  Not Detected   CORONAVIRUS 229E  Not Detected   CORONAVIRUS HKU1  Not Detected   CORONAVIRUS NL63  Not Detected   CORONAVIRUS OC43  Not Detected   HUMAN METAPNEUMOVIRUS  Not Detected   HUMAN RHINOVIRUS/ENTEROVIRUS  Not Detected   INFLUENZA B PCR  Not Detected   PARAINFLUENZA 1  Not Detected   PARAINFLUENZA VIRUS 2  Not Detected   PARAINFLUENZA VIRUS 3  Not Detected   PARAINFLUENZA VIRUS 4  Not Detected   BORDETELLA PERTUSSIS PCR  Not Detected   CHLAMYDOPHILA PNEUMONIAE PCR  Not Detected   MYCOPLAMA PNEUMO PCR  Not Detected   INFLUENZA A PCR  Not Detected   RSV, PCR  Not Detected             No results found for: \"TSH\"  Estimated Creatinine Clearance: 66.1 mL/min (by C-G formula based on SCr of 0.96 mg/dL).         Imaging:  Imaging Results (Last 24 Hours)       Procedure Component Value Units Date/Time    CT Head Without Contrast [[id number]] Collected: 03/12/24 0149     Updated: 03/12/24 0151    Narrative:      CT HEAD WO CONTRAST    Date of Exam: 3/12/2024 1:44 AM EDT    Indication: trauma.    Comparison: 11/1/2018.    Technique: Axial CT images were obtained of the head without contrast administration.  Coronal reconstructions were performed.  Automated exposure control and iterative reconstruction methods were used.      Findings:  There is no evidence of hemorrhage. There is no mass effect or midline shift.    There is no extracerebral collection.    Ventricles are normal in size and configuration for patient's stated age.      Posterior fossa is within normal limits.    Calvarium and skull base appear intact.   Visualized sinuses show no air fluid levels. Visualized orbits are unremarkable.      Impression:      " Impression:  No acute intracranial process identified.        Electronically Signed: Bibiana Abad MD    3/12/2024 1:49 AM EDT    Workstation ID: [id number]    XR Chest 1 View [[id number]] Collected: 03/11/24 2340     Updated: 03/11/24 2343    Narrative:      XR CHEST 1 VW    Date of Exam: 3/11/2024 11:32 PM EDT    Indication: soa    Comparison: 11/4/2018.    Findings:  The heart appears mildly enlarged, stable as compared to the previous study. No sizable pleural effusion identified. Patchy airspace disease is seen within the left upper lobe and the bilateral mid lung region. Some of this is related to scarring though   increased as compared to the previous study. New patchy airspace T8 disease is also present within the right upper lobe. No pneumothorax. Emphysematous changes are present.      Impression:      Impression:  Multifocal pneumonia. Follow-up to resolution is recommended to exclude underlying malignancy.     Electronically Signed: Bibiana Abad MD    3/11/2024 11:41 PM EDT    Workstation ID: [id number]              Current Meds:   SCHEDULE  albuterol sulfate HFA, 2 puff, Inhalation, Q6H While Awake - RT  [START ON 3/13/2024] cefTRIAXone, 1,000 mg, Intravenous, Q24H  dexAMETHasone, 6 mg, Oral, Daily With Breakfast  doxycycline, 100 mg, Intravenous, Q12H  enoxaparin, 30 mg, Subcutaneous, Daily      Infusions     PRNs    benzonatate    Calcium Replacement - Follow Nurse / BPA Driven Protocol    guaiFENesin    ipratropium-albuterol    Magnesium Standard Dose Replacement - Follow Nurse / BPA Driven Protocol    nitroglycerin    Phosphorus Replacement - Follow Nurse / BPA Driven Protocol    Potassium Replacement - Follow Nurse / BPA Driven Protocol    sodium chloride        Ted Saunders MD  3/12/2024  07:57 EDT      Much of this encounter note is an electronic transcription/translation of spoken language to printed text using Dragon Software.

## 2024-03-12 NOTE — ED PROVIDER NOTES
Subjective   History of Present Illness  73-year-old male with fall at home this evening.  Patient states he was getting out of bed and slipped on the hardwood floor and hit his head.  Patient is unsure if he had LOC.  Patient was too weak to get up off of the floor for at least 3 hours per patient.  Wife states patient has been more unsteady on his feet for the past 24 hours.  Patient states this has been an ongoing issue for several months.  Patient was just treated with Zithromax and prednisone for a COPD exacerbation and states he feels a little bit better.  Patient complains of a mild to moderate headache, mild bilateral hip pain.      Review of Systems   Constitutional:  Positive for fatigue.   Respiratory:  Positive for cough, shortness of breath and wheezing.    Neurological:  Positive for headaches.       No past medical history on file.    Allergies   Allergen Reactions    Vancomycin Rash       No past surgical history on file.    No family history on file.    Social History     Socioeconomic History    Marital status:            Objective   Physical Exam  Constitutional:       Appearance: Normal appearance.   HENT:      Head: Normocephalic and atraumatic.      Mouth/Throat:      Mouth: Mucous membranes are moist.      Pharynx: Oropharynx is clear.   Eyes:      Extraocular Movements: Extraocular movements intact.      Conjunctiva/sclera: Conjunctivae normal.      Pupils: Pupils are equal, round, and reactive to light.   Cardiovascular:      Rate and Rhythm: Normal rate and regular rhythm.      Heart sounds: Normal heart sounds.   Pulmonary:      Effort: Pulmonary effort is normal.      Breath sounds: Wheezing present.   Abdominal:      General: Bowel sounds are normal. There is no distension.      Palpations: Abdomen is soft.      Tenderness: There is no abdominal tenderness.   Musculoskeletal:      Cervical back: Normal range of motion and neck supple. No tenderness.      Comments: Full range of  motion in all 4 extremities without any pain or tenderness, thoracic and lumbar spine nontender.  Mild left proximal forearm abrasion.   Skin:     General: Skin is warm and dry.      Capillary Refill: Capillary refill takes less than 2 seconds.   Neurological:      General: No focal deficit present.      Mental Status: He is alert and oriented to person, place, and time.   Psychiatric:         Mood and Affect: Mood normal.         Behavior: Behavior normal.         Procedures           ED Course                                             Medical Decision Making  Patient stable, O2 sat 90% on room air, placed on oxygen, will give dexamethasone given mild hypoxemia in the setting of COVID, treated for possible bacterial superinfection given elevated procalcitonin, will admit to the hospital, case discussed with Valorie with hospital service, agrees with plan.    Problems Addressed:  COVID-19: complicated acute illness or injury  Multifocal pneumonia: complicated acute illness or injury    Amount and/or Complexity of Data Reviewed  Labs: ordered.  Radiology: ordered.  ECG/medicine tests: ordered and independent interpretation performed.     Details: EKG interpretation: Normal sinus rhythm, rate 88, no acute ST change    Risk  Prescription drug management.  Decision regarding hospitalization.        Final diagnoses:   Multifocal pneumonia   COVID-19       ED Disposition  ED Disposition       ED Disposition   Decision to Admit    Condition   --    Comment   Level of Care: Telemetry [5]   Admitting Physician: BREN PIPER [[id number]]                 No follow-up provider specified.       Medication List      No changes were made to your prescriptions during this visit.            Leroy Reyes MD  03/12/24 0206

## 2024-03-13 PROBLEM — J96.21 ACUTE ON CHRONIC RESPIRATORY FAILURE WITH HYPOXIA: Status: ACTIVE | Noted: 2024-03-13

## 2024-03-13 LAB
ANION GAP SERPL CALCULATED.3IONS-SCNC: 10 MMOL/L (ref 5–15)
BASOPHILS # BLD AUTO: 0 10*3/MM3 (ref 0–0.2)
BASOPHILS NFR BLD AUTO: 0 % (ref 0–1.5)
BUN SERPL-MCNC: 29 MG/DL (ref 8–23)
BUN/CREAT SERPL: 36.3 (ref 7–25)
CALCIUM SPEC-SCNC: 8.5 MG/DL (ref 8.6–10.5)
CHLORIDE SERPL-SCNC: 100 MMOL/L (ref 98–107)
CK SERPL-CCNC: 55 U/L (ref 20–200)
CO2 SERPL-SCNC: 27 MMOL/L (ref 22–29)
CREAT SERPL-MCNC: 0.8 MG/DL (ref 0.76–1.27)
CRP SERPL-MCNC: 18.42 MG/DL (ref 0–0.5)
D DIMER PPP FEU-MCNC: 0.78 MG/L (FEU) (ref 0–0.75)
DEPRECATED RDW RBC AUTO: 46.4 FL (ref 37–54)
EGFRCR SERPLBLD CKD-EPI 2021: 92.3 ML/MIN/1.73
EOSINOPHIL # BLD AUTO: 0 10*3/MM3 (ref 0–0.4)
EOSINOPHIL NFR BLD AUTO: 0 % (ref 0.3–6.2)
ERYTHROCYTE [DISTWIDTH] IN BLOOD BY AUTOMATED COUNT: 13.1 % (ref 12.3–15.4)
GLUCOSE SERPL-MCNC: 140 MG/DL (ref 65–99)
HCT VFR BLD AUTO: 34.1 % (ref 37.5–51)
HGB BLD-MCNC: 11.2 G/DL (ref 13–17.7)
LYMPHOCYTES # BLD AUTO: 0.4 10*3/MM3 (ref 0.7–3.1)
LYMPHOCYTES NFR BLD AUTO: 3.1 % (ref 19.6–45.3)
MCH RBC QN AUTO: 31.6 PG (ref 26.6–33)
MCHC RBC AUTO-ENTMCNC: 32.7 G/DL (ref 31.5–35.7)
MCV RBC AUTO: 96.5 FL (ref 79–97)
MONOCYTES # BLD AUTO: 0.5 10*3/MM3 (ref 0.1–0.9)
MONOCYTES NFR BLD AUTO: 3.9 % (ref 5–12)
NEUTROPHILS NFR BLD AUTO: 11.3 10*3/MM3 (ref 1.7–7)
NEUTROPHILS NFR BLD AUTO: 93 % (ref 42.7–76)
NRBC BLD AUTO-RTO: 0 /100 WBC (ref 0–0.2)
PLATELET # BLD AUTO: 222 10*3/MM3 (ref 140–450)
PMV BLD AUTO: 8.9 FL (ref 6–12)
POTASSIUM SERPL-SCNC: 4.6 MMOL/L (ref 3.5–5.2)
RBC # BLD AUTO: 3.54 10*6/MM3 (ref 4.14–5.8)
SODIUM SERPL-SCNC: 137 MMOL/L (ref 136–145)
WBC NRBC COR # BLD AUTO: 12.1 10*3/MM3 (ref 3.4–10.8)

## 2024-03-13 PROCEDURE — 82550 ASSAY OF CK (CPK): CPT | Performed by: NURSE PRACTITIONER

## 2024-03-13 PROCEDURE — 25010000002 ENOXAPARIN PER 10 MG: Performed by: HOSPITALIST

## 2024-03-13 PROCEDURE — 85025 COMPLETE CBC W/AUTO DIFF WBC: CPT | Performed by: NURSE PRACTITIONER

## 2024-03-13 PROCEDURE — 86140 C-REACTIVE PROTEIN: CPT | Performed by: NURSE PRACTITIONER

## 2024-03-13 PROCEDURE — 94799 UNLISTED PULMONARY SVC/PX: CPT

## 2024-03-13 PROCEDURE — 63710000001 MYCOPHENOLATE MOFETIL PER 250 MG: Performed by: INTERNAL MEDICINE

## 2024-03-13 PROCEDURE — 94761 N-INVAS EAR/PLS OXIMETRY MLT: CPT

## 2024-03-13 PROCEDURE — 97162 PT EVAL MOD COMPLEX 30 MIN: CPT

## 2024-03-13 PROCEDURE — 80048 BASIC METABOLIC PNL TOTAL CA: CPT | Performed by: NURSE PRACTITIONER

## 2024-03-13 PROCEDURE — 94664 DEMO&/EVAL PT USE INHALER: CPT

## 2024-03-13 PROCEDURE — 63710000001 AZATHIOPRINE PER 50 MG: Performed by: INTERNAL MEDICINE

## 2024-03-13 PROCEDURE — 25010000002 CEFTRIAXONE PER 250 MG: Performed by: INTERNAL MEDICINE

## 2024-03-13 PROCEDURE — 85379 FIBRIN DEGRADATION QUANT: CPT | Performed by: NURSE PRACTITIONER

## 2024-03-13 RX ORDER — ENOXAPARIN SODIUM 100 MG/ML
40 INJECTION SUBCUTANEOUS DAILY
Status: DISCONTINUED | OUTPATIENT
Start: 2024-03-13 | End: 2024-03-16 | Stop reason: HOSPADM

## 2024-03-13 RX ORDER — HYDRALAZINE HYDROCHLORIDE 20 MG/ML
10 INJECTION INTRAMUSCULAR; INTRAVENOUS EVERY 6 HOURS PRN
Status: DISCONTINUED | OUTPATIENT
Start: 2024-03-13 | End: 2024-03-16 | Stop reason: HOSPADM

## 2024-03-13 RX ADMIN — DEXAMETHASONE 6 MG: 6 TABLET ORAL at 08:41

## 2024-03-13 RX ADMIN — ENOXAPARIN SODIUM 40 MG: 100 INJECTION SUBCUTANEOUS at 17:20

## 2024-03-13 RX ADMIN — TIOTROPIUM BROMIDE INHALATION SPRAY 2 PUFF: 3.12 SPRAY, METERED RESPIRATORY (INHALATION) at 08:36

## 2024-03-13 RX ADMIN — GUAIFENESIN 1200 MG: 600 TABLET, EXTENDED RELEASE ORAL at 21:05

## 2024-03-13 RX ADMIN — AZATHIOPRINE 50 MG: 50 TABLET ORAL at 21:05

## 2024-03-13 RX ADMIN — AZATHIOPRINE 50 MG: 50 TABLET ORAL at 08:41

## 2024-03-13 RX ADMIN — Medication 5000 UNITS: at 08:41

## 2024-03-13 RX ADMIN — MYCOPHENOLATE MOFETIL 500 MG: 250 CAPSULE ORAL at 08:41

## 2024-03-13 RX ADMIN — MYCOPHENOLATE MOFETIL 500 MG: 250 CAPSULE ORAL at 21:05

## 2024-03-13 RX ADMIN — CEFTRIAXONE 2000 MG: 2 INJECTION, POWDER, FOR SOLUTION INTRAMUSCULAR; INTRAVENOUS at 00:09

## 2024-03-13 RX ADMIN — Medication 10 ML: at 21:06

## 2024-03-13 RX ADMIN — CARVEDILOL 25 MG: 6.25 TABLET, FILM COATED ORAL at 08:41

## 2024-03-13 RX ADMIN — GUAIFENESIN 1200 MG: 600 TABLET, EXTENDED RELEASE ORAL at 08:41

## 2024-03-13 RX ADMIN — BUDESONIDE AND FORMOTEROL FUMARATE DIHYDRATE 2 PUFF: 160; 4.5 AEROSOL RESPIRATORY (INHALATION) at 19:25

## 2024-03-13 RX ADMIN — ALBUTEROL SULFATE 2 PUFF: 108 AEROSOL, METERED RESPIRATORY (INHALATION) at 07:00

## 2024-03-13 RX ADMIN — ALBUTEROL SULFATE 2 PUFF: 108 AEROSOL, METERED RESPIRATORY (INHALATION) at 11:49

## 2024-03-13 RX ADMIN — BUDESONIDE AND FORMOTEROL FUMARATE DIHYDRATE 2 PUFF: 160; 4.5 AEROSOL RESPIRATORY (INHALATION) at 07:04

## 2024-03-13 RX ADMIN — CARVEDILOL 25 MG: 6.25 TABLET, FILM COATED ORAL at 17:20

## 2024-03-13 RX ADMIN — ALBUTEROL SULFATE 2 PUFF: 108 AEROSOL, METERED RESPIRATORY (INHALATION) at 19:19

## 2024-03-13 RX ADMIN — DOXYCYCLINE 100 MG: 100 INJECTION, POWDER, LYOPHILIZED, FOR SOLUTION INTRAVENOUS at 00:10

## 2024-03-13 NOTE — PLAN OF CARE
Goal Outcome Evaluation:                                            Patient doing well this shift, no new complaints. Patient remains on 2L O2 continuously. Patient is on IV rocephin for PNA until 3/15. Patient also receiving PO decadron. PT worked with patient and cleared patient. Patient is from home with wife and should return. Patient is A&O X4, takes medications whole and is up ad adelfo after PT eval.

## 2024-03-13 NOTE — PLAN OF CARE
Goal Outcome Evaluation:               Patient remains on 2L NC. BP elevated, provider notified. Patient stable at this time.

## 2024-03-13 NOTE — PROGRESS NOTES
[facility]     Progress Note    Patient Name: Dayne Goff  : 1950  MRN: [id number]  Primary Care Physician:  Missy Villalobos PA-C  Date of admission: 3/11/2024  Service date and time: 24 11:52 EDT  Subjective   Subjective     Chief Complaint: SOB    HPI:  Patient Reports feeling a bit better      Objective   Objective     Vitals:   Temp:  [97 °F (36.1 °C)-98.2 °F (36.8 °C)] 97.5 °F (36.4 °C)  Heart Rate:  [61-83] 68  Resp:  [-24] 21  BP: (103-178)/() 138/78  Flow (L/min):  [2] 2  Physical Exam    Constitutional: Awake, alert   Eyes: PERRLA, sclerae anicteric, no conjunctival injection   HENT: NCAT, mucous membranes moist   Neck: Supple, no thyromegaly, no lymphadenopathy, trachea midline   Respiratory: decreased BS   Cardiovascular: RRR, no murmurs, rubs, or gallops, palpable pedal pulses bilaterally   Gastrointestinal: Positive bowel sounds, soft, nontender, nondistended   Musculoskeletal: No bilateral ankle edema, no clubbing or cyanosis to extremities   Psychiatric: Appropriate affect, cooperative   Neurologic: Oriented x 3, strength symmetric in all extremities, Cranial Nerves grossly intact to confrontation, speech clear   Skin: No rashes     Result Review    Result Review:  I have personally reviewed the results from the time of this admission to 3/13/2024 11:52 EDT and agree with these findings:  [x]  Laboratory list / accordion  [x]  Microbiology  [x]  Radiology  [x]  EKG/Telemetry   [x]  Cardiology/Vascular   []  Pathology  []  Old records  []  Other:        Assessment & Plan   Assessment / Plan       Active Hospital Problems:  Active Hospital Problems    Diagnosis     **COVID     Acute on chronic respiratory failure with hypoxia     Multifocal pneumonia     COPD with acute exacerbation      Plan:    - cont IV abx, f/u cultures  - cont decadron, out of window for remdesivir  - cont with supplemental oxygen, wean as tolerated  - pulm following, appreciate assistance  -  trend labs, supportive care    DVT prophylaxis:  Medical DVT prophylaxis orders are present.        CODE STATUS:   Code Status (Patient has no pulse and is not breathing): CPR (Attempt to Resuscitate)  Medical Interventions (Patient has pulse or is breathing): Full Support    Disposition:  I expect patient to be discharged 2 days.    Piyush Hussein MD

## 2024-03-13 NOTE — THERAPY EVALUATION
Patient Name: Dayne Goff  : 1950    MRN: [id number]                              Today's Date: 3/13/2024       Admit Date: 3/11/2024    Visit Dx:     ICD-10-CM ICD-9-CM   1. Multifocal pneumonia  J18.9 486   2. COVID-19  U07.1 079.89     Patient Active Problem List   Diagnosis    COVID    Anemia    Arthritis    Sleep apnea    History of malignant melanoma    Osteoporosis    COPD with acute exacerbation    Tobacco dependence syndrome    Multifocal pneumonia    Acute on chronic respiratory failure with hypoxia     History reviewed. No pertinent past medical history.  History reviewed. No pertinent surgical history.   General Information       Row Name 24 1119          Physical Therapy Time and Intention    Document Type evaluation  -OD     Mode of Treatment physical therapy  -OD       Row Name 24 1119          General Information    Patient Profile Reviewed yes  -OD     Prior Level of Function independent:;ADL's;driving;all household mobility  -OD     Existing Precautions/Restrictions oxygen therapy device and L/min;other (see comments)  2L  -OD     Barriers to Rehab none identified  -OD       Row Name 24 1119          Living Environment    People in Home spouse  -OD       Row Name 24 1119          Home Main Entrance    Number of Stairs, Main Entrance three  -OD       Row Name 24 1119          Stairs Within Home, Primary    Number of Stairs, Within Home, Primary none  -OD       Row Name 24 1119          Cognition    Orientation Status (Cognition) oriented x 4  -OD       Row Name 24 1119          Safety Issues, Functional Mobility    Impairments Affecting Function (Mobility) shortness of breath  -OD               User Key  (r) = Recorded By, (t) = Taken By, (c) = Cosigned By      Initials Name Provider Type    OD Florence Dailey PT Physical Therapist                   Mobility       Row Name 24 1121          Bed Mobility    Bed Mobility bed mobility (all)  activities  -OD     All Activities, Sully (Bed Mobility) independent  -OD       Row Name 03/13/24 1121          Bed-Chair Transfer    Bed-Chair Sully (Transfers) independent  -OD       Row Name 03/13/24 1121          Sit-Stand Transfer    Sit-Stand Sully (Transfers) independent  -OD       Row Name 03/13/24 1121          Gait/Stairs (Locomotion)    Gait/Stairs Locomotion gait/ambulation independence  -OD     Sully Level (Gait) standby assist  -OD     Patient was able to Ambulate yes  -OD     Distance in Feet (Gait) 24  -OD               User Key  (r) = Recorded By, (t) = Taken By, (c) = Cosigned By      Initials Name Provider Type    OD Florence Dailey PT Physical Therapist                   Obj/Interventions       Row Name 03/13/24 1123          Range of Motion Comprehensive    General Range of Motion no range of motion deficits identified  -OD       Row Name 03/13/24 1123          Strength Comprehensive (MMT)    General Manual Muscle Testing (MMT) Assessment no strength deficits identified  -OD       Row Name 03/13/24 1123          Balance    Balance Interventions sitting;standing;minimal challenge  -OD       Row Name 03/13/24 1123          Sensory Assessment (Somatosensory)    Sensory Assessment (Somatosensory) sensation intact  -OD               User Key  (r) = Recorded By, (t) = Taken By, (c) = Cosigned By      Initials Name Provider Type    OD Florence Dailey PT Physical Therapist                   Goals/Plan    No documentation.                  Clinical Impression       Row Name 03/13/24 1123          Pain    Pretreatment Pain Rating 0/10 - no pain  -OD     Posttreatment Pain Rating 0/10 - no pain  -OD       Row Name 03/13/24 1123          Plan of Care Review    Plan of Care Reviewed With patient  -OD     Progress improving  -OD     Outcome Evaluation Dayne Goff is a 72 y/o M with PMH of COPD who was admitted to [facility] on 3/11/24 after falling at home. Pt with several recent  falls. Resp panel (+) COVID. Chest X-ray shows multifocal PNA. Pt currently AAOx4 and on 2L O2 via NC. At baseline, pt lives in [facility] with 3STE with his spouse. Pt is indep with ADLs, mobility without AD, and driving. Pt reports several falls recently, saying they just put new hardwood floor in, and he keep slipping when getting out of bed. Pt also reported tripping over extension cord in the garage. Edu regarding reducing obstacles in home for safe mobility, and using anti-slip socks, slippers with , and/or carpet square by where he gets out of bed for reducing risk for fall. Pt was indep with mobility and demo good static/dynamic balance for ADLs in the bathroom. Pt desaturated on room air while ambulating, instructed through breathing techniques and donned NC. Pt recovered immediately. Pt appears at baseline function and safe to d/c home when medically appropriate. PT will sign off.  -OD       Row Name 03/13/24 1123          Therapy Assessment/Plan (PT)    Criteria for Skilled Interventions Met (PT) no;no problems identified which require skilled intervention  -OD     Therapy Frequency (PT) evaluation only  -OD       Row Name 03/13/24 1123          Vital Signs    Pre SpO2 (%) 94  -OD     O2 Delivery Pre Treatment nasal cannula  -OD     Intra SpO2 (%) 89  -OD     O2 Delivery Intra Treatment room air  -OD     Post SpO2 (%) 94  -OD     O2 Delivery Post Treatment nasal cannula  -OD     Pre Patient Position Supine  -OD     Intra Patient Position Standing  -OD     Post Patient Position Sitting  -OD       Row Name 03/13/24 1123          Positioning and Restraints    Pre-Treatment Position in bed  -OD     Post Treatment Position chair  -OD     In Chair notified nsg;reclined;call light within reach;encouraged to call for assist  -OD               User Key  (r) = Recorded By, (t) = Taken By, (c) = Cosigned By      Initials Name Provider Type    OD Florence Dailey PT Physical Therapist                   Outcome Measures        Row Name 03/13/24 1126 03/13/24 0845       How much help from another person do you currently need...    Turning from your back to your side while in flat bed without using bedrails? 4  -OD 4  -AK    Moving from lying on back to sitting on the side of a flat bed without bedrails? 4  -OD 4  -AK    Moving to and from a bed to a chair (including a wheelchair)? 4  -OD 4  -AK    Standing up from a chair using your arms (e.g., wheelchair, bedside chair)? 4  -OD 4  -AK    Climbing 3-5 steps with a railing? 4  -OD 4  -AK    To walk in hospital room? 4  -OD 4  -AK    AM-PAC 6 Clicks Score (PT) 24  -OD 24  -AK    Highest Level of Mobility Goal 8 --> Walked 250 feet or more  -OD 8 --> Walked 250 feet or more  -AK      Row Name 03/13/24 1126          Functional Assessment    Outcome Measure Options AM-PAC 6 Clicks Basic Mobility (PT)  -OD               User Key  (r) = Recorded By, (t) = Taken By, (c) = Cosigned By      Initials Name Provider Type    AK Leidy Reinoso, RN Registered Nurse    Florence Quintanilla, PT Physical Therapist                                 Physical Therapy Education       Title: PT OT SLP Therapies (Done)       Topic: Physical Therapy (Done)       Point: Mobility training (Done)       Learning Progress Summary             Patient Acceptance, E, VU by OD at 3/13/2024 1126                         Point: Home exercise program (Done)       Learning Progress Summary             Patient Acceptance, E, VU by OD at 3/13/2024 1126                         Point: Body mechanics (Done)       Learning Progress Summary             Patient Acceptance, E, VU by OD at 3/13/2024 1126                         Point: Precautions (Done)       Learning Progress Summary             Patient Acceptance, E, VU by OD at 3/13/2024 1126                                         User Key       Initials Effective Dates Name Provider Type Discipline    OD 05/11/23 -  Florence Dailey, MARLEEN Physical Therapist PT                  PT  Recommendation and Plan     Plan of Care Reviewed With: patient  Progress: improving  Outcome Evaluation: Dayne Goff is a 72 y/o M with PMH of COPD who was admitted to [facility] on 3/11/24 after falling at home. Pt with several recent falls. Resp panel (+) COVID. Chest X-ray shows multifocal PNA. Pt currently AAOx4 and on 2L O2 via NC. At baseline, pt lives in [facility] with [facility] with his spouse. Pt is indep with ADLs, mobility without AD, and driving. Pt reports several falls recently, saying they just put new hardwood floor in, and he keep slipping when getting out of bed. Pt also reported tripping over extension cord in the garage. Edu regarding reducing obstacles in home for safe mobility, and using anti-slip socks, slippers with , and/or carpet square by where he gets out of bed for reducing risk for fall. Pt was indep with mobility and demo good static/dynamic balance for ADLs in the bathroom. Pt desaturated on room air while ambulating, instructed through breathing techniques and donned NC. Pt recovered immediately. Pt appears at baseline function and safe to d/c home when medically appropriate. PT will sign off.     Time Calculation:         PT Charges       Row Name 03/13/24 1128             Time Calculation    Start Time 0909  -OD      Stop Time 0931  -OD      Time Calculation (min) 22 min  -OD      PT Received On 03/13/24  -OD         Time Calculation- PT    Total Timed Code Minutes- PT 0 minute(s)  -OD                User Key  (r) = Recorded By, (t) = Taken By, (c) = Cosigned By      Initials Name Provider Type    OD Florence Dailey, PT Physical Therapist                  Therapy Charges for Today       Code Description Service Date Service Provider Modifiers Qty    [id number]  PT EVAL MOD COMPLEXITY 4 3/13/2024 Florence Dailey, PT GP 1            PT G-Codes  Outcome Measure Options: AM-PAC 6 Clicks Basic Mobility (PT)  AM-PAC 6 Clicks Score (PT): 24  PT Discharge Summary  Anticipated Discharge Disposition  (PT): home    Florence Ludy, PT  3/13/2024

## 2024-03-13 NOTE — PROGRESS NOTES
Daily Progress Note        COVID    COPD with acute exacerbation    Multifocal pneumonia    Assessment:    COVID-19 infection  Multifocal pneumonia  COPD no PFTs on chart  Chronic hypoxemia  Leukocytosis     Chest x-ray 3/11/2024 shows multifocal pneumonia, CT scan from 2018 shows severe emphysema and scarring in the lower lobes bilaterally.        Recommendations:    Antibiotics: Rocephin  Oxygen supplement and titration to maintain saturation 90 to 95%: Currently requiring 2 L per nasal cannula  Bronchodilators  Inhaled corticosteroids: Symbicort  Mucinex  Vitamin D  DVT prophylaxis    Need PET scan as an outpatient     I personally reviewed the radiological studies          LOS: 1 day     Subjective         Objective     Vital signs for last 24 hours:  Vitals:    03/13/24 0707 03/13/24 0818 03/13/24 0836 03/13/24 0838   BP:  158/95     BP Location:  Right arm     Patient Position:  Lying     Pulse: 67 65 72 73   Resp: 16 13 18 22   Temp:  97.8 °F (36.6 °C)     TempSrc:  Oral     SpO2: 96% 97% 96% 96%   Weight:       Height:           Intake/Output last 3 shifts:  I/O last 3 completed shifts:  In: 400 [P.O.:400]  Out: 950 [Urine:950]  Intake/Output this shift:  I/O this shift:  In: -   Out: 150 [Urine:150]      Radiology  Imaging Results (Last 24 Hours)       ** No results found for the last 24 hours. **            Labs:  Results from last 7 days   Lab Units 03/13/24  0347   WBC 10*3/mm3 12.10*   HEMOGLOBIN g/dL 11.2*   HEMATOCRIT % 34.1*   PLATELETS 10*3/mm3 222     Results from last 7 days   Lab Units 03/13/24  0347 03/12/24  0831 03/11/24  2316   SODIUM mmol/L 137   < > 135*   POTASSIUM mmol/L 4.6   < > 4.2   CHLORIDE mmol/L 100   < > 99   CO2 mmol/L 27.0   < > 27.0   BUN mg/dL 29*   < > 26*   CREATININE mg/dL 0.80   < > 0.96   CALCIUM mg/dL 8.5*   < > 8.3*   BILIRUBIN mg/dL  --   --  0.5   ALK PHOS U/L  --   --  59   ALT (SGPT) U/L  --   --  9   AST (SGOT) U/L  --   --  15   GLUCOSE mg/dL 140*   < > 126*    <  > = values in this interval not displayed.         Results from last 7 days   Lab Units 03/11/24  2316   ALBUMIN g/dL 3.0*     Results from last 7 days   Lab Units 03/13/24  0347 03/12/24  0257 03/12/24  0152 03/11/24  2316   CK TOTAL U/L 55 81  --  145   HSTROP T ng/L  --   --  10 11                           Meds:   SCHEDULE  albuterol sulfate HFA, 2 puff, Inhalation, Q6H While Awake - RT  azaTHIOprine, 50 mg, Oral, BID  budesonide-formoterol, 2 puff, Inhalation, BID - RT  carvedilol, 25 mg, Oral, BID With Meals  cefTRIAXone, 2,000 mg, Intravenous, Q24H  dexAMETHasone, 6 mg, Oral, Daily With Breakfast  doxycycline, 100 mg, Intravenous, Q12H  enoxaparin, 30 mg, Subcutaneous, Daily  guaiFENesin, 1,200 mg, Oral, Q12H  mycophenolate, 500 mg, Oral, Q12H  tiotropium bromide monohydrate, 2 puff, Inhalation, Daily - RT  vitamin D3, 5,000 Units, Oral, Daily      Infusions     PRNs    albuterol    benzonatate    Calcium Replacement - Follow Nurse / BPA Driven Protocol    guaiFENesin    hydrALAZINE    ipratropium-albuterol    Magnesium Standard Dose Replacement - Follow Nurse / BPA Driven Protocol    nitroglycerin    Phosphorus Replacement - Follow Nurse / BPA Driven Protocol    Potassium Replacement - Follow Nurse / BPA Driven Protocol    sodium chloride    Physical Exam:  Physical Exam  Cardiovascular:      Heart sounds: Murmur heard.      No gallop.   Pulmonary:      Effort: No respiratory distress.      Breath sounds: No stridor. Rhonchi and rales present. No wheezing.   Chest:      Chest wall: No tenderness.         ROS  Review of Systems   Respiratory:  Positive for cough and shortness of breath. Negative for wheezing and stridor.    Cardiovascular:  Negative for chest pain, palpitations and leg swelling.             Total time spent with patient greater than: 45 Minutes

## 2024-03-13 NOTE — PLAN OF CARE
Goal Outcome Evaluation:  Plan of Care Reviewed With: patient        Progress: improving  Outcome Evaluation: Dayne Goff is a 72 y/o M with PMH of COPD who was admitted to [facility] on 3/11/24 after falling at home. Pt with several recent falls. Resp panel (+) COVID. Chest X-ray shows multifocal PNA. Pt currently AAOx4 and on 2L O2 via NC. At baseline, pt lives in [facility] with 3STE with his spouse. Pt is indep with ADLs, mobility without AD, and driving. Pt reports several falls recently, saying they just put new hardwood floor in, and he keep slipping when getting out of bed. Pt also reported tripping over extension cord in the garage. Edu regarding reducing obstacles in home for safe mobility, and using anti-slip socks, slippers with , and/or carpet square by where he gets out of bed for reducing risk for fall. Pt was indep with mobility and demo good static/dynamic balance for ADLs in the bathroom. Pt desaturated on room air while ambulating, instructed through breathing techniques and donned NC. Pt recovered immediately. Pt appears at baseline function and safe to d/c home when medically appropriate. PT will sign off.      Anticipated Discharge Disposition (PT): home

## 2024-03-14 LAB
ANION GAP SERPL CALCULATED.3IONS-SCNC: 9 MMOL/L (ref 5–15)
BUN SERPL-MCNC: 29 MG/DL (ref 8–23)
BUN/CREAT SERPL: 37.2 (ref 7–25)
CALCIUM SPEC-SCNC: 8.4 MG/DL (ref 8.6–10.5)
CHLORIDE SERPL-SCNC: 100 MMOL/L (ref 98–107)
CK SERPL-CCNC: 51 U/L (ref 20–200)
CO2 SERPL-SCNC: 24 MMOL/L (ref 22–29)
CREAT SERPL-MCNC: 0.78 MG/DL (ref 0.76–1.27)
CRP SERPL-MCNC: 7.92 MG/DL (ref 0–0.5)
DEPRECATED RDW RBC AUTO: 45.7 FL (ref 37–54)
EGFRCR SERPLBLD CKD-EPI 2021: 93 ML/MIN/1.73
ERYTHROCYTE [DISTWIDTH] IN BLOOD BY AUTOMATED COUNT: 12.4 % (ref 12.3–15.4)
GLUCOSE SERPL-MCNC: 137 MG/DL (ref 65–99)
HCT VFR BLD AUTO: 35.7 % (ref 37.5–51)
HGB BLD-MCNC: 11.1 G/DL (ref 13–17.7)
LYMPHOCYTES # BLD MANUAL: 0.48 10*3/MM3 (ref 0.7–3.1)
LYMPHOCYTES NFR BLD MANUAL: 5 % (ref 5–12)
MCH RBC QN AUTO: 31.4 PG (ref 26.6–33)
MCHC RBC AUTO-ENTMCNC: 31.1 G/DL (ref 31.5–35.7)
MCV RBC AUTO: 101.1 FL (ref 79–97)
METAMYELOCYTES NFR BLD MANUAL: 2 % (ref 0–0)
MONOCYTES # BLD: 0.8 10*3/MM3 (ref 0.1–0.9)
NEUTROPHILS # BLD AUTO: 14.32 10*3/MM3 (ref 1.7–7)
NEUTROPHILS NFR BLD MANUAL: 84 % (ref 42.7–76)
NEUTS BAND NFR BLD MANUAL: 6 % (ref 0–5)
PLATELET # BLD AUTO: 246 10*3/MM3 (ref 140–450)
PMV BLD AUTO: 10.2 FL (ref 6–12)
POLYCHROMASIA BLD QL SMEAR: ABNORMAL
POTASSIUM SERPL-SCNC: 4.7 MMOL/L (ref 3.5–5.2)
RBC # BLD AUTO: 3.53 10*6/MM3 (ref 4.14–5.8)
SCAN SLIDE: NORMAL
SMALL PLATELETS BLD QL SMEAR: ADEQUATE
SODIUM SERPL-SCNC: 133 MMOL/L (ref 136–145)
VARIANT LYMPHS NFR BLD MANUAL: 3 % (ref 19.6–45.3)
WBC MORPH BLD: NORMAL
WBC NRBC COR # BLD AUTO: 15.91 10*3/MM3 (ref 3.4–10.8)

## 2024-03-14 PROCEDURE — 63710000001 MYCOPHENOLATE MOFETIL PER 250 MG: Performed by: INTERNAL MEDICINE

## 2024-03-14 PROCEDURE — 25010000002 MORPHINE PER 10 MG: Performed by: HOSPITALIST

## 2024-03-14 PROCEDURE — 85007 BL SMEAR W/DIFF WBC COUNT: CPT | Performed by: NURSE PRACTITIONER

## 2024-03-14 PROCEDURE — 63710000001 AZATHIOPRINE PER 50 MG: Performed by: INTERNAL MEDICINE

## 2024-03-14 PROCEDURE — 25010000002 HYDRALAZINE PER 20 MG: Performed by: HOSPITALIST

## 2024-03-14 PROCEDURE — 25010000002 ENOXAPARIN PER 10 MG: Performed by: HOSPITALIST

## 2024-03-14 PROCEDURE — 94799 UNLISTED PULMONARY SVC/PX: CPT

## 2024-03-14 PROCEDURE — 82550 ASSAY OF CK (CPK): CPT | Performed by: NURSE PRACTITIONER

## 2024-03-14 PROCEDURE — 94664 DEMO&/EVAL PT USE INHALER: CPT

## 2024-03-14 PROCEDURE — 86140 C-REACTIVE PROTEIN: CPT | Performed by: NURSE PRACTITIONER

## 2024-03-14 PROCEDURE — 25010000002 CEFTRIAXONE PER 250 MG: Performed by: HOSPITALIST

## 2024-03-14 PROCEDURE — 85025 COMPLETE CBC W/AUTO DIFF WBC: CPT | Performed by: NURSE PRACTITIONER

## 2024-03-14 PROCEDURE — 80048 BASIC METABOLIC PNL TOTAL CA: CPT | Performed by: NURSE PRACTITIONER

## 2024-03-14 RX ADMIN — AZATHIOPRINE 50 MG: 50 TABLET ORAL at 21:30

## 2024-03-14 RX ADMIN — BUDESONIDE AND FORMOTEROL FUMARATE DIHYDRATE 2 PUFF: 160; 4.5 AEROSOL RESPIRATORY (INHALATION) at 19:01

## 2024-03-14 RX ADMIN — TIOTROPIUM BROMIDE INHALATION SPRAY 2 PUFF: 3.12 SPRAY, METERED RESPIRATORY (INHALATION) at 07:14

## 2024-03-14 RX ADMIN — Medication 5000 UNITS: at 09:05

## 2024-03-14 RX ADMIN — GUAIFENESIN 1200 MG: 600 TABLET, EXTENDED RELEASE ORAL at 09:05

## 2024-03-14 RX ADMIN — ALBUTEROL SULFATE 2 PUFF: 108 AEROSOL, METERED RESPIRATORY (INHALATION) at 15:26

## 2024-03-14 RX ADMIN — MYCOPHENOLATE MOFETIL 500 MG: 250 CAPSULE ORAL at 21:30

## 2024-03-14 RX ADMIN — CEFTRIAXONE 2000 MG: 2 INJECTION, POWDER, FOR SOLUTION INTRAMUSCULAR; INTRAVENOUS at 01:21

## 2024-03-14 RX ADMIN — CARVEDILOL 25 MG: 6.25 TABLET, FILM COATED ORAL at 17:00

## 2024-03-14 RX ADMIN — GUAIFENESIN 1200 MG: 600 TABLET, EXTENDED RELEASE ORAL at 21:30

## 2024-03-14 RX ADMIN — AZATHIOPRINE 50 MG: 50 TABLET ORAL at 09:05

## 2024-03-14 RX ADMIN — ENOXAPARIN SODIUM 40 MG: 100 INJECTION SUBCUTANEOUS at 16:56

## 2024-03-14 RX ADMIN — MORPHINE SULFATE 4 MG: 4 INJECTION, SOLUTION INTRAMUSCULAR; INTRAVENOUS at 21:59

## 2024-03-14 RX ADMIN — BUDESONIDE AND FORMOTEROL FUMARATE DIHYDRATE 2 PUFF: 160; 4.5 AEROSOL RESPIRATORY (INHALATION) at 07:14

## 2024-03-14 RX ADMIN — CARVEDILOL 25 MG: 6.25 TABLET, FILM COATED ORAL at 09:05

## 2024-03-14 RX ADMIN — ALBUTEROL SULFATE 2 PUFF: 108 AEROSOL, METERED RESPIRATORY (INHALATION) at 07:14

## 2024-03-14 RX ADMIN — Medication 10 ML: at 09:05

## 2024-03-14 RX ADMIN — HYDRALAZINE HYDROCHLORIDE 10 MG: 20 INJECTION INTRAMUSCULAR; INTRAVENOUS at 17:06

## 2024-03-14 RX ADMIN — ALBUTEROL SULFATE 2 PUFF: 108 AEROSOL, METERED RESPIRATORY (INHALATION) at 19:01

## 2024-03-14 RX ADMIN — DEXAMETHASONE 6 MG: 6 TABLET ORAL at 09:05

## 2024-03-14 RX ADMIN — MYCOPHENOLATE MOFETIL 500 MG: 250 CAPSULE ORAL at 09:05

## 2024-03-14 NOTE — PROGRESS NOTES
Daily Progress Note        COVID    COPD with acute exacerbation    Multifocal pneumonia    Acute on chronic respiratory failure with hypoxia    Assessment:    COVID-19 infection  Multifocal pneumonia  COPD no PFTs on chart  Chronic hypoxemia  Leukocytosis     Chest x-ray 3/11/2024 shows multifocal pneumonia, CT scan from 2018 shows severe emphysema and scarring in the lower lobes bilaterally.        Recommendations:    Antibiotics: Rocephin  Oxygen supplement and titration to maintain saturation 90 to 95%: Currently requiring 2 L per nasal cannula  Bronchodilators  Inhaled corticosteroids: Symbicort  Mucinex  Vitamin D  DVT prophylaxis    Need PET scan as an outpatient     I personally reviewed the radiological studies          LOS: 2 days     Subjective         Objective     Vital signs for last 24 hours:  Vitals:    03/14/24 0721 03/14/24 0722 03/14/24 0725 03/14/24 0805   BP:       BP Location:       Patient Position:       Pulse: 61 60 60 78   Resp: 18 18 18 17   Temp:       TempSrc:       SpO2: 96% 96% 96% 90%   Weight:       Height:           Intake/Output last 3 shifts:  I/O last 3 completed shifts:  In: 720 [P.O.:720]  Out: 1225 [Urine:1225]  Intake/Output this shift:  No intake/output data recorded.      Radiology  Imaging Results (Last 24 Hours)       ** No results found for the last 24 hours. **            Labs:  Results from last 7 days   Lab Units 03/14/24  0251   WBC 10*3/mm3 15.91*   HEMOGLOBIN g/dL 11.1*   HEMATOCRIT % 35.7*   PLATELETS 10*3/mm3 246     Results from last 7 days   Lab Units 03/14/24  0251 03/12/24  0831 03/11/24  2316   SODIUM mmol/L 133*   < > 135*   POTASSIUM mmol/L 4.7   < > 4.2   CHLORIDE mmol/L 100   < > 99   CO2 mmol/L 24.0   < > 27.0   BUN mg/dL 29*   < > 26*   CREATININE mg/dL 0.78   < > 0.96   CALCIUM mg/dL 8.4*   < > 8.3*   BILIRUBIN mg/dL  --   --  0.5   ALK PHOS U/L  --   --  59   ALT (SGPT) U/L  --   --  9   AST (SGOT) U/L  --   --  15   GLUCOSE mg/dL 137*   < > 126*     < > = values in this interval not displayed.         Results from last 7 days   Lab Units 03/11/24  2316   ALBUMIN g/dL 3.0*     Results from last 7 days   Lab Units 03/14/24  0251 03/13/24  0347 03/12/24  0257 03/12/24  0152 03/11/24  2316   CK TOTAL U/L 51 55 81  --  145   HSTROP T ng/L  --   --   --  10 11                           Meds:   SCHEDULE  albuterol sulfate HFA, 2 puff, Inhalation, Q6H While Awake - RT  azaTHIOprine, 50 mg, Oral, BID  budesonide-formoterol, 2 puff, Inhalation, BID - RT  carvedilol, 25 mg, Oral, BID With Meals  cefTRIAXone, 2,000 mg, Intravenous, Q24H  dexAMETHasone, 6 mg, Oral, Daily With Breakfast  enoxaparin, 40 mg, Subcutaneous, Daily  guaiFENesin, 1,200 mg, Oral, Q12H  mycophenolate, 500 mg, Oral, Q12H  tiotropium bromide monohydrate, 2 puff, Inhalation, Daily - RT  vitamin D3, 5,000 Units, Oral, Daily      Infusions     PRNs    albuterol    benzonatate    Calcium Replacement - Follow Nurse / BPA Driven Protocol    guaiFENesin    hydrALAZINE    ipratropium-albuterol    Magnesium Standard Dose Replacement - Follow Nurse / BPA Driven Protocol    nitroglycerin    Phosphorus Replacement - Follow Nurse / BPA Driven Protocol    Potassium Replacement - Follow Nurse / BPA Driven Protocol    sodium chloride    Physical Exam:  Physical Exam  Cardiovascular:      Heart sounds: Murmur heard.      No gallop.   Pulmonary:      Effort: No respiratory distress.      Breath sounds: No stridor. Rhonchi and rales present. No wheezing.   Chest:      Chest wall: No tenderness.         ROS  Review of Systems   Respiratory:  Positive for cough and shortness of breath. Negative for wheezing and stridor.    Cardiovascular:  Negative for chest pain, palpitations and leg swelling.             Total time spent with patient greater than: 45 Minutes

## 2024-03-14 NOTE — PROGRESS NOTES
[facility]     Progress Note    Patient Name: Dayne Goff  : 1950  MRN: [id number]  Primary Care Physician:  Missy Villalobos PA-C  Date of admission: 3/11/2024  Service date and time: 24 10:43 EDT  Subjective   Subjective     Chief Complaint: SOB    HPI:  Patient doing better today, on 2 liters NC      Objective   Objective     Vitals:   Temp:  [97.4 °F (36.3 °C)-97.8 °F (36.6 °C)] 97.8 °F (36.6 °C)  Heart Rate:  [60-82] 78  Resp:  [15-23] 17  BP: (138-171)/(71-99) 161/97  Flow (L/min):  [2] 2  Physical Exam    Constitutional: Awake, alert   Eyes: PERRLA, sclerae anicteric, no conjunctival injection   HENT: NCAT, mucous membranes moist   Neck: Supple, no thyromegaly, no lymphadenopathy, trachea midline   Respiratory: decreased BS   Cardiovascular: RRR, no murmurs, rubs, or gallops, palpable pedal pulses bilaterally   Gastrointestinal: Positive bowel sounds, soft, nontender, nondistended   Musculoskeletal: No bilateral ankle edema, no clubbing or cyanosis to extremities   Psychiatric: Appropriate affect, cooperative   Neurologic: Oriented x 3, strength symmetric in all extremities, Cranial Nerves grossly intact to confrontation, speech clear   Skin: No rashes     Result Review    Result Review:  I have personally reviewed the results from the time of this admission to 3/14/2024 10:43 EDT and agree with these findings:  [x]  Laboratory list / accordion  [x]  Microbiology  [x]  Radiology  [x]  EKG/Telemetry   [x]  Cardiology/Vascular   []  Pathology  []  Old records  []  Other:        Assessment & Plan   Assessment / Plan       Active Hospital Problems:  Active Hospital Problems    Diagnosis     **COVID     Acute on chronic respiratory failure with hypoxia     Multifocal pneumonia     COPD with acute exacerbation      Plan:    - cont IV abx, cultures NG  - cont decadron, out of window for remdesivir  - cont with supplemental oxygen, wean as tolerated  - pulm following, appreciate  assistance  - trend labs, supportive care  - walking oximetry before discharge, doing better    DVT prophylaxis:  Medical DVT prophylaxis orders are present.        CODE STATUS:   Code Status (Patient has no pulse and is not breathing): CPR (Attempt to Resuscitate)  Medical Interventions (Patient has pulse or is breathing): Full Support    Disposition:  I expect patient to be discharged 1 days.    Piyush Hussein MD

## 2024-03-14 NOTE — PLAN OF CARE
Goal Outcome Evaluation:   Patient alert and oriented x4. Able to verbalized needs. No complaints made. Stable during this shift. Kept safe and comfortable

## 2024-03-14 NOTE — CONSULTS
"Nutrition Services    Patient Name: Dayne Goff  YOB: 1950  MRN: [id number]  Admission date: 3/11/2024    Comment:    Continue current diet and encourage good PO intake.  NFPE completed and not consistent with nutrition diagnosis of malnutrition at this time using AND/ASPEN criteria       CLINICAL NUTRITION ASSESSMENT      Reason for Assessment 3/14: MST: 2     H&P      History reviewed. No pertinent past medical history.    History reviewed. No pertinent surgical history.     Current Problems   PNA  Covid 19  COPD exacerbation  -abx       Encounter Information        Trending Narrative     3/14: Pt admitted to [facility] with generalized weakness s/p 3 recent falls at home in the last month. Pt covid 19 + on admission. RD visited pt at bedside. Pt reported he is eating as well here as he does at home. Pt generally eats 2-3 meals daily. Usually pt's breakfast is light and lunch and dinner are his heavier meals. Pt does not drink ONS and does not wish to drink them here. Pt reported 30# wt loss over a 3 month period in the last year. Pt denied food allergies and difficulty chewing/swallowing as long as he has his dentures in. NFPE completed and not consistent with nutrition diagnosis of malnutrition at this time using AND/ASPEN criteria. Pt with severe muscle wasting to clavicular region.     Anthropometrics        Current Height, Weight Height: 177.8 cm (70\")  Weight: 70.3 kg (155 lb) (03/11/24 2200)       Usual Body Weight (UBW) Unknown        Trending Weight Hx     This admission: 3/14: 155# - wt method not recorded             PTA: No wt hx to review     Wt Readings from Last 30 Encounters:   03/11/24 2200 70.3 kg (155 lb)      BMI kg/m2 Body mass index is 22.24 kg/m².       Labs        Pertinent Labs    Results from last 7 days   Lab Units 03/14/24  0251 03/13/24  0347 03/12/24  0831 03/11/24  2316   SODIUM mmol/L 133* 137 134* 135*   POTASSIUM mmol/L 4.7 4.6 4.1 4.2   CHLORIDE mmol/L 100 100 99 99 " "  CO2 mmol/L 24.0 27.0 27.0 27.0   BUN mg/dL 29* 29* 23 26*   CREATININE mg/dL 0.78 0.80 0.74* 0.96   CALCIUM mg/dL 8.4* 8.5* 8.2* 8.3*   BILIRUBIN mg/dL  --   --   --  0.5   ALK PHOS U/L  --   --   --  59   ALT (SGPT) U/L  --   --   --  9   AST (SGOT) U/L  --   --   --  15   GLUCOSE mg/dL 137* 140* 137* 126*     Results from last 7 days   Lab Units 03/14/24  0251   HEMOGLOBIN g/dL 11.1*   HEMATOCRIT % 35.7*     No results found for: \"HGBA1C\"     Medications    Scheduled Medications albuterol sulfate HFA, 2 puff, Inhalation, Q6H While Awake - RT  azaTHIOprine, 50 mg, Oral, BID  budesonide-formoterol, 2 puff, Inhalation, BID - RT  carvedilol, 25 mg, Oral, BID With Meals  cefTRIAXone, 2,000 mg, Intravenous, Q24H  dexAMETHasone, 6 mg, Oral, Daily With Breakfast  enoxaparin, 40 mg, Subcutaneous, Daily  guaiFENesin, 1,200 mg, Oral, Q12H  mycophenolate, 500 mg, Oral, Q12H  tiotropium bromide monohydrate, 2 puff, Inhalation, Daily - RT  vitamin D3, 5,000 Units, Oral, Daily        Infusions      PRN Medications   albuterol    benzonatate    Calcium Replacement - Follow Nurse / BPA Driven Protocol    guaiFENesin    hydrALAZINE    ipratropium-albuterol    Magnesium Standard Dose Replacement - Follow Nurse / BPA Driven Protocol    nitroglycerin    Phosphorus Replacement - Follow Nurse / BPA Driven Protocol    Potassium Replacement - Follow Nurse / BPA Driven Protocol    sodium chloride     Physical Findings        Trending Physical   Appearance, NFPE 3/14: NFPE completed and not consistent with nutrition diagnosis of malnutrition at this time using AND/ASPEN criteria      --  Edema  None documented      Bowel Function + BM on 3/14     Tubes None      Chewing/Swallowing No reported difficulty      Skin Intact      --  Current Nutrition Orders & Evaluation of Intake       Oral Nutrition     Food Allergies NKFA   Current PO Diet Diet: Regular/House; Fluid Consistency: Thin (IDDSI 0)   Supplement -   PO Evaluation     Trending % " PO Intake 3/14: 100%   --  Nutritional Risk Screening        NRS-2002 Score          Nutrition Diagnosis         Nutrition Dx Problem 1 No acute nutrition dx at this time; RD to follow up per protocol      Nutrition Dx Problem 2        Intervention Goal         Intervention Goal(s) PO intake > 75%     Nutrition Intervention        RD Action NFPE completed     Nutrition Prescription          Diet Prescription Regular    Supplement Prescription -   --  Monitor/Evaluation        Monitor Per protocol, PO intake, Pertinent labs, Weight       Electronically signed by:  Katie Magallon RD  03/14/24 13:19 EDT

## 2024-03-14 NOTE — PLAN OF CARE
Goal Outcome Evaluation:  Plan of Care Reviewed With: patient           Outcome Evaluation: Patient was admitted after falling at home and he is Covid positive. Patient has PNA as well and oxygen levels and stats seem to be improving. He is adlib an dhis gait is improving. Patient is back to baseline and expected to discharge on 3/15.

## 2024-03-14 NOTE — CASE MANAGEMENT/SOCIAL WORK
Continued Stay Note  JOHNNY Gonzalez     Patient Name: Dayne Goff  MRN: [id number]  Today's Date: 3/14/2024    Admit Date: 3/11/2024    Plan: Routine home with wife. Home O2 2L HS w/ [facility].   Discharge Plan       Row Name 03/14/24 1408       Plan    Plan Routine home with wife. Home O2 2L HS w/ [facility].    Patient/Family in Agreement with Plan yes    Plan Comments DC barriers: Covid+, IV Rocephin and oral steroids. PT worked with pt yesterday 3/13 and safe to return home.             Megan Naegele, RN     Office Phone: [phone]  Office Cell: [phone]

## 2024-03-15 LAB
BACTERIA SPEC RESP CULT: ABNORMAL
BACTERIA SPEC RESP CULT: ABNORMAL
BASOPHILS # BLD AUTO: 0.03 10*3/MM3 (ref 0–0.2)
BASOPHILS NFR BLD AUTO: 0.2 % (ref 0–1.5)
CRP SERPL-MCNC: 5.14 MG/DL (ref 0–0.5)
D DIMER PPP FEU-MCNC: 1.15 MG/L (FEU) (ref 0–0.75)
DEPRECATED RDW RBC AUTO: 45 FL (ref 37–54)
EOSINOPHIL # BLD AUTO: 0 10*3/MM3 (ref 0–0.4)
EOSINOPHIL NFR BLD AUTO: 0 % (ref 0.3–6.2)
ERYTHROCYTE [DISTWIDTH] IN BLOOD BY AUTOMATED COUNT: 12.2 % (ref 12.3–15.4)
GRAM STN SPEC: ABNORMAL
HCT VFR BLD AUTO: 39.2 % (ref 37.5–51)
HGB BLD-MCNC: 12.2 G/DL (ref 13–17.7)
IMM GRANULOCYTES # BLD AUTO: 0.29 10*3/MM3 (ref 0–0.05)
IMM GRANULOCYTES NFR BLD AUTO: 1.9 % (ref 0–0.5)
LYMPHOCYTES # BLD AUTO: 0.44 10*3/MM3 (ref 0.7–3.1)
LYMPHOCYTES NFR BLD AUTO: 2.9 % (ref 19.6–45.3)
MCH RBC QN AUTO: 31.1 PG (ref 26.6–33)
MCHC RBC AUTO-ENTMCNC: 31.1 G/DL (ref 31.5–35.7)
MCV RBC AUTO: 100 FL (ref 79–97)
MONOCYTES # BLD AUTO: 0.68 10*3/MM3 (ref 0.1–0.9)
MONOCYTES NFR BLD AUTO: 4.4 % (ref 5–12)
NEUTROPHILS NFR BLD AUTO: 13.94 10*3/MM3 (ref 1.7–7)
NEUTROPHILS NFR BLD AUTO: 90.6 % (ref 42.7–76)
NRBC BLD AUTO-RTO: 0 /100 WBC (ref 0–0.2)
PLATELET # BLD AUTO: 246 10*3/MM3 (ref 140–450)
PMV BLD AUTO: 10.2 FL (ref 6–12)
RBC # BLD AUTO: 3.92 10*6/MM3 (ref 4.14–5.8)
WBC NRBC COR # BLD AUTO: 15.38 10*3/MM3 (ref 3.4–10.8)

## 2024-03-15 PROCEDURE — 25010000002 CEFEPIME PER 500 MG: Performed by: INTERNAL MEDICINE

## 2024-03-15 PROCEDURE — 94799 UNLISTED PULMONARY SVC/PX: CPT

## 2024-03-15 PROCEDURE — 85025 COMPLETE CBC W/AUTO DIFF WBC: CPT | Performed by: NURSE PRACTITIONER

## 2024-03-15 PROCEDURE — 63710000001 MYCOPHENOLATE MOFETIL PER 250 MG: Performed by: INTERNAL MEDICINE

## 2024-03-15 PROCEDURE — 85379 FIBRIN DEGRADATION QUANT: CPT | Performed by: NURSE PRACTITIONER

## 2024-03-15 PROCEDURE — 25010000002 ENOXAPARIN PER 10 MG: Performed by: HOSPITALIST

## 2024-03-15 PROCEDURE — 94664 DEMO&/EVAL PT USE INHALER: CPT

## 2024-03-15 PROCEDURE — 25010000002 CEFTRIAXONE PER 250 MG: Performed by: HOSPITALIST

## 2024-03-15 PROCEDURE — 25010000002 HYDRALAZINE PER 20 MG: Performed by: HOSPITALIST

## 2024-03-15 PROCEDURE — 86140 C-REACTIVE PROTEIN: CPT | Performed by: NURSE PRACTITIONER

## 2024-03-15 PROCEDURE — 63710000001 AZATHIOPRINE PER 50 MG: Performed by: INTERNAL MEDICINE

## 2024-03-15 RX ORDER — ACETAMINOPHEN 325 MG/1
650 TABLET ORAL EVERY 6 HOURS PRN
Status: DISCONTINUED | OUTPATIENT
Start: 2024-03-15 | End: 2024-03-16 | Stop reason: HOSPADM

## 2024-03-15 RX ADMIN — HYDRALAZINE HYDROCHLORIDE 10 MG: 20 INJECTION INTRAMUSCULAR; INTRAVENOUS at 03:48

## 2024-03-15 RX ADMIN — CARVEDILOL 25 MG: 6.25 TABLET, FILM COATED ORAL at 17:35

## 2024-03-15 RX ADMIN — AZATHIOPRINE 50 MG: 50 TABLET ORAL at 21:17

## 2024-03-15 RX ADMIN — ALBUTEROL SULFATE 2 PUFF: 108 AEROSOL, METERED RESPIRATORY (INHALATION) at 14:20

## 2024-03-15 RX ADMIN — ENOXAPARIN SODIUM 40 MG: 100 INJECTION SUBCUTANEOUS at 17:35

## 2024-03-15 RX ADMIN — TIOTROPIUM BROMIDE INHALATION SPRAY 2 PUFF: 3.12 SPRAY, METERED RESPIRATORY (INHALATION) at 07:21

## 2024-03-15 RX ADMIN — CEFTRIAXONE 2000 MG: 2 INJECTION, POWDER, FOR SOLUTION INTRAMUSCULAR; INTRAVENOUS at 01:01

## 2024-03-15 RX ADMIN — ALBUTEROL SULFATE 2 PUFF: 108 AEROSOL, METERED RESPIRATORY (INHALATION) at 20:43

## 2024-03-15 RX ADMIN — GUAIFENESIN 1200 MG: 600 TABLET, EXTENDED RELEASE ORAL at 09:46

## 2024-03-15 RX ADMIN — CEFEPIME 2000 MG: 2 INJECTION, POWDER, FOR SOLUTION INTRAVENOUS at 11:08

## 2024-03-15 RX ADMIN — DEXAMETHASONE 6 MG: 6 TABLET ORAL at 09:46

## 2024-03-15 RX ADMIN — CARVEDILOL 25 MG: 6.25 TABLET, FILM COATED ORAL at 09:46

## 2024-03-15 RX ADMIN — AZATHIOPRINE 50 MG: 50 TABLET ORAL at 09:46

## 2024-03-15 RX ADMIN — BUDESONIDE AND FORMOTEROL FUMARATE DIHYDRATE 2 PUFF: 160; 4.5 AEROSOL RESPIRATORY (INHALATION) at 07:21

## 2024-03-15 RX ADMIN — ALBUTEROL SULFATE 2 PUFF: 108 AEROSOL, METERED RESPIRATORY (INHALATION) at 07:21

## 2024-03-15 RX ADMIN — Medication 5000 UNITS: at 09:46

## 2024-03-15 RX ADMIN — BUDESONIDE AND FORMOTEROL FUMARATE DIHYDRATE 2 PUFF: 160; 4.5 AEROSOL RESPIRATORY (INHALATION) at 20:38

## 2024-03-15 RX ADMIN — MYCOPHENOLATE MOFETIL 500 MG: 250 CAPSULE ORAL at 21:17

## 2024-03-15 RX ADMIN — CEFEPIME 2000 MG: 2 INJECTION, POWDER, FOR SOLUTION INTRAVENOUS at 17:35

## 2024-03-15 RX ADMIN — MYCOPHENOLATE MOFETIL 500 MG: 250 CAPSULE ORAL at 09:46

## 2024-03-15 RX ADMIN — GUAIFENESIN 1200 MG: 600 TABLET, EXTENDED RELEASE ORAL at 21:17

## 2024-03-15 RX ADMIN — ACETAMINOPHEN 650 MG: 325 TABLET, FILM COATED ORAL at 22:02

## 2024-03-15 NOTE — PROGRESS NOTES
[facility]     Progress Note    Patient Name: Dayne Goff  : 1950  MRN: [id number]  Primary Care Physician:  Missy Villalobos PA-C  Date of admission: 3/11/2024  Service date and time: 03/15/24 11:04 EDT  Subjective   Subjective     Chief Complaint: SOB      Patient seen and examined this morning.  Taking over care today.  Doing much better, breathing continues to improve, not at baseline yet.  Blood pressure slightly elevated today, home med Coreg has been resumed.  Continue hydralazine as needed, may need to add second agent if remains high.  Discussed with family at bedside.    Pertinent positives as noted in HPI/subjective.  All other systems were reviewed and are negative.      Objective   Objective     Vitals:   Temp:  [97.4 °F (36.3 °C)-98.7 °F (37.1 °C)] 97.7 °F (36.5 °C)  Heart Rate:  [59-80] 80  Resp:  [15-25] 21  BP: (134-188)/() 144/78  Flow (L/min):  [2] 2  Physical Exam   General: Awake, alert, NAD  Eyes: PERRL, EOMI, conjunctivae are clear  Cardiovascular: Regular rate and rhythm, no murmurs  Respiratory: Mild rhonchi bilaterally, no wheezing or rales, unlabored breathing  Abdomen: Soft, nontender, positive bowel sounds, no guarding  Neurologic: A&O, CN grossly intact, moves all extremities spontaneously  Musculoskeletal: Normal range of motion, no other gross deformities  Skin: Warm, dry      Result Review    Result Review:  I have personally reviewed the results from the time of this admission to 3/15/2024 11:04 EDT and agree with these findings:  [x]  Laboratory list / accordion  [x]  Microbiology  [x]  Radiology  [x]  EKG/Telemetry   [x]  Cardiology/Vascular   []  Pathology  [x]  Old records  []  Other:        Assessment & Plan   Assessment / Plan       Active Hospital Problems:  Active Hospital Problems    Diagnosis     **COVID     Acute on chronic respiratory failure with hypoxia     Multifocal pneumonia     COPD with acute exacerbation      Plan:    - Respiratory  status slowly improving, close to baseline oxygen requirement  - cont decadron, out of window for remdesivir  -Respiratory cultures with Pseudomonas noted, antibiotics changed to cefepime, follow-up on sensitivity results  - cont with supplemental oxygen, wean as tolerated  - pulm following, appreciate assistance  - trend labs, supportive care  - walking oximetry before discharge, doing better overall  -Diastolic BP has been slightly elevated, continue home Coreg, add second agent if remains elevated  -Continue hydralazine as needed    DVT prophylaxis:  Medical DVT prophylaxis orders are present.        CODE STATUS:   Code Status (Patient has no pulse and is not breathing): CPR (Attempt to Resuscitate)  Medical Interventions (Patient has pulse or is breathing): Full Support    Disposition: Likely discharge in next 24 to 48 hours if continues to improve    Electronically signed by Kym Solano DO, 03/15/24, 11:06 AM EDT.    Part of this note may be an electronic transcription/translation of spoken language to printed text using the Dragon Dictation System.

## 2024-03-15 NOTE — PLAN OF CARE
Goal Outcome Evaluation:  Plan of Care Reviewed With: patient           Outcome Evaluation: pt with elevated bp and c/o headache, MD notified and orders received; remains on O2 at 2L per n/c and IV abx; up with assist; continue to monitor

## 2024-03-15 NOTE — PLAN OF CARE
Problem: Adult Inpatient Plan of Care  Goal: Plan of Care Review  Outcome: Ongoing, Not Progressing  Flowsheets (Taken 3/15/2024 1448)  Progress: no change  Plan of Care Reviewed With: patient  Goal: Patient-Specific Goal (Individualized)  Outcome: Ongoing, Not Progressing  Goal: Absence of Hospital-Acquired Illness or Injury  Outcome: Ongoing, Not Progressing  Intervention: Identify and Manage Fall Risk  Recent Flowsheet Documentation  Taken 3/15/2024 1428 by Leidy Mata RN  Safety Promotion/Fall Prevention:   assistive device/personal items within reach   clutter free environment maintained   nonskid shoes/slippers when out of bed   room organization consistent   safety round/check completed  Taken 3/15/2024 1236 by Leidy Mata RN  Safety Promotion/Fall Prevention:   assistive device/personal items within reach   clutter free environment maintained   nonskid shoes/slippers when out of bed   room organization consistent   safety round/check completed  Taken 3/15/2024 1047 by Leidy Mata RN  Safety Promotion/Fall Prevention:   assistive device/personal items within reach   clutter free environment maintained   nonskid shoes/slippers when out of bed   room organization consistent   safety round/check completed  Taken 3/15/2024 0845 by Leidy Mata RN  Safety Promotion/Fall Prevention:   assistive device/personal items within reach   clutter free environment maintained   nonskid shoes/slippers when out of bed   room organization consistent   safety round/check completed  Intervention: Prevent Skin Injury  Recent Flowsheet Documentation  Taken 3/15/2024 0845 by Leidy Mata RN  Body Position:   supine   position changed independently  Intervention: Prevent and Manage VTE (Venous Thromboembolism) Risk  Recent Flowsheet Documentation  Taken 3/15/2024 0845 by Leidy Mata RN  Activity Management:   ambulated in room   up ad adelfo   activity encouraged  VTE Prevention/Management: sequential compression  devices off  Range of Motion: active ROM (range of motion) encouraged  Intervention: Prevent Infection  Recent Flowsheet Documentation  Taken 3/15/2024 1428 by Leidy Mata RN  Infection Prevention:   environmental surveillance performed   hand hygiene promoted   personal protective equipment utilized   single patient room provided  Taken 3/15/2024 1236 by Leidy Mata RN  Infection Prevention:   environmental surveillance performed   hand hygiene promoted   personal protective equipment utilized   single patient room provided   visitors restricted/screened  Taken 3/15/2024 1047 by Leidy Mata RN  Infection Prevention:   environmental surveillance performed   hand hygiene promoted   personal protective equipment utilized   single patient room provided   visitors restricted/screened  Taken 3/15/2024 0845 by Leidy Mata RN  Infection Prevention:   environmental surveillance performed   hand hygiene promoted   personal protective equipment utilized   single patient room provided   visitors restricted/screened  Goal: Optimal Comfort and Wellbeing  Outcome: Ongoing, Not Progressing  Intervention: Monitor Pain and Promote Comfort  Recent Flowsheet Documentation  Taken 3/15/2024 1236 by Leidy Mata RN  Pain Management Interventions: diversional activity provided  Intervention: Provide Person-Centered Care  Recent Flowsheet Documentation  Taken 3/15/2024 0845 by Leidy Mata RN  Trust Relationship/Rapport:   care explained   choices provided  Goal: Readiness for Transition of Care  Outcome: Ongoing, Not Progressing     Problem: Breathing Pattern Ineffective  Goal: Effective Breathing Pattern  Outcome: Ongoing, Not Progressing  Intervention: Promote Improved Breathing Pattern  Recent Flowsheet Documentation  Taken 3/15/2024 0845 by Leidy Mata RN  Supportive Measures: active listening utilized  Head of Bed (HOB) Positioning: HOB at 60-90 degrees  Airway/Ventilation Management: airway patency  maintained  Breathing Techniques/Airway Clearance: deep/controlled cough encouraged     Problem: Hypertension Comorbidity  Goal: Blood Pressure in Desired Range  Outcome: Ongoing, Not Progressing  Intervention: Maintain Blood Pressure Management  Recent Flowsheet Documentation  Taken 3/15/2024 1428 by Leidy Mata RN  Medication Review/Management:   medications reviewed   high-risk medications identified  Taken 3/15/2024 1236 by Leidy Mata RN  Medication Review/Management:   medications reviewed   high-risk medications identified  Taken 3/15/2024 1047 by Leidy Mata RN  Medication Review/Management:   medications reviewed   high-risk medications identified  Taken 3/15/2024 0845 by Leidy Mata RN  Medication Review/Management:   medications reviewed   high-risk medications identified     Problem: Fall Injury Risk  Goal: Absence of Fall and Fall-Related Injury  Outcome: Ongoing, Not Progressing  Intervention: Identify and Manage Contributors  Recent Flowsheet Documentation  Taken 3/15/2024 1428 by Leidy Mata RN  Medication Review/Management:   medications reviewed   high-risk medications identified  Taken 3/15/2024 1236 by Leidy Mata RN  Medication Review/Management:   medications reviewed   high-risk medications identified  Taken 3/15/2024 1047 by Leidy Mata RN  Medication Review/Management:   medications reviewed   high-risk medications identified  Taken 3/15/2024 0845 by Leidy Mata RN  Medication Review/Management:   medications reviewed   high-risk medications identified  Self-Care Promotion: independence encouraged  Intervention: Promote Injury-Free Environment  Recent Flowsheet Documentation  Taken 3/15/2024 1428 by Leidy Mata RN  Safety Promotion/Fall Prevention:   assistive device/personal items within reach   clutter free environment maintained   nonskid shoes/slippers when out of bed   room organization consistent   safety round/check completed  Taken 3/15/2024 1236  by Hindle, Leidy, RN  Safety Promotion/Fall Prevention:   assistive device/personal items within reach   clutter free environment maintained   nonskid shoes/slippers when out of bed   room organization consistent   safety round/check completed  Taken 3/15/2024 1047 by Leidy Mata RN  Safety Promotion/Fall Prevention:   assistive device/personal items within reach   clutter free environment maintained   nonskid shoes/slippers when out of bed   room organization consistent   safety round/check completed  Taken 3/15/2024 0845 by Leidy Mata RN  Safety Promotion/Fall Prevention:   assistive device/personal items within reach   clutter free environment maintained   nonskid shoes/slippers when out of bed   room organization consistent   safety round/check completed     Problem: Adjustment to Illness (Sepsis/Septic Shock)  Goal: Optimal Coping  Outcome: Ongoing, Not Progressing  Intervention: Optimize Psychosocial Adjustment to Illness  Recent Flowsheet Documentation  Taken 3/15/2024 0845 by Leidy Mata RN  Supportive Measures: active listening utilized  Family/Support System Care:   support provided   self-care encouraged     Problem: Bleeding (Sepsis/Septic Shock)  Goal: Absence of Bleeding  Outcome: Ongoing, Not Progressing  Intervention: Monitor and Manage Bleeding  Recent Flowsheet Documentation  Taken 3/15/2024 0845 by Leidy Mata RN  Bleeding Precautions: monitored for signs of bleeding     Problem: Glycemic Control Impaired (Sepsis/Septic Shock)  Goal: Blood Glucose Level Within Desired Range  Outcome: Ongoing, Not Progressing     Problem: Infection Progression (Sepsis/Septic Shock)  Goal: Absence of Infection Signs and Symptoms  Outcome: Ongoing, Not Progressing  Intervention: Initiate Sepsis Management  Recent Flowsheet Documentation  Taken 3/15/2024 1428 by Leidy Mata RN  Infection Prevention:   environmental surveillance performed   hand hygiene promoted   personal protective equipment  utilized   single patient room provided  Isolation Precautions:   precautions maintained   droplet   enhanced contact  Taken 3/15/2024 1236 by Leidy Mata RN  Infection Prevention:   environmental surveillance performed   hand hygiene promoted   personal protective equipment utilized   single patient room provided   visitors restricted/screened  Isolation Precautions:   precautions maintained   droplet   enhanced contact  Taken 3/15/2024 1047 by Leidy Mata RN  Infection Prevention:   environmental surveillance performed   hand hygiene promoted   personal protective equipment utilized   single patient room provided   visitors restricted/screened  Isolation Precautions:   precautions maintained   droplet   enhanced contact  Taken 3/15/2024 0845 by Leidy Mata RN  Infection Prevention:   environmental surveillance performed   hand hygiene promoted   personal protective equipment utilized   single patient room provided   visitors restricted/screened  Isolation Precautions:   precautions maintained   enhanced contact   droplet  Intervention: Promote Recovery  Recent Flowsheet Documentation  Taken 3/15/2024 0845 by Leidy Mata RN  Activity Management:   ambulated in room   up ad adelfo   activity encouraged  Airway/Ventilation Support: cough relief provided  Sleep/Rest Enhancement: family presence promoted     Problem: Nutrition Impaired (Sepsis/Septic Shock)  Goal: Optimal Nutrition Intake  Outcome: Ongoing, Not Progressing   Goal Outcome Evaluation:  Plan of Care Reviewed With: patient        Progress: no change     Alert and oriented x 4. Able to verbalize needs and wants. Takes medication whole and tolerates well. Continent of bowel and bladder. Does not require O2 therapy at this time. C/O headache noted, refused pharmalogical interventions. IV Cefepime initiated, first does received. No s/s of adverse/allergic reaction noted. Continues to require O2 therapy at 2 LPM via NC and tolerating well. Baseline  O2 needs are 2 LPM HS. PT has signed off. Continues to be followed by pulmonology who would like patient's O2 to be 90-95%. Discharge plans: home. Currently in bed, awake. Call bell in reach.

## 2024-03-15 NOTE — PROGRESS NOTES
Daily Progress Note        COVID    COPD with acute exacerbation    Multifocal pneumonia    Acute on chronic respiratory failure with hypoxia    Assessment:    COVID-19 infection  Multifocal pneumonia  COPD no PFTs on chart  Chronic hypoxemia  Leukocytosis     Chest x-ray 3/11/2024 shows multifocal pneumonia, CT scan from 2018 shows severe emphysema and scarring in the lower lobes bilaterally.        Recommendations:    Antibiotics: Rocephin  Oxygen supplement and titration to maintain saturation 90 to 95%: Currently requiring 2 L per nasal cannula  Bronchodilators  Inhaled corticosteroids: Symbicort  Mucinex  Vitamin D  DVT prophylaxis    Need PET scan as an outpatient     I personally reviewed the radiological studies          LOS: 3 days     Subjective         Objective     Vital signs for last 24 hours:  Vitals:    03/15/24 0729 03/15/24 0732 03/15/24 0757 03/15/24 0946   BP:   (!) 134/113 144/78   BP Location:       Patient Position:       Pulse: 67 69 71 80   Resp: 18 18 21    Temp:   97.7 °F (36.5 °C)    TempSrc:   Oral    SpO2: 96% 97% 94%    Weight:       Height:           Intake/Output last 3 shifts:  I/O last 3 completed shifts:  In: 1320 [P.O.:1320]  Out: 2650 [Urine:2650]  Intake/Output this shift:  I/O this shift:  In: -   Out: 450 [Urine:450]      Radiology  Imaging Results (Last 24 Hours)       ** No results found for the last 24 hours. **            Labs:  Results from last 7 days   Lab Units 03/15/24  0341   WBC 10*3/mm3 15.38*   HEMOGLOBIN g/dL 12.2*   HEMATOCRIT % 39.2   PLATELETS 10*3/mm3 246     Results from last 7 days   Lab Units 03/14/24  0251 03/12/24  0831 03/11/24  2316   SODIUM mmol/L 133*   < > 135*   POTASSIUM mmol/L 4.7   < > 4.2   CHLORIDE mmol/L 100   < > 99   CO2 mmol/L 24.0   < > 27.0   BUN mg/dL 29*   < > 26*   CREATININE mg/dL 0.78   < > 0.96   CALCIUM mg/dL 8.4*   < > 8.3*   BILIRUBIN mg/dL  --   --  0.5   ALK PHOS U/L  --   --  59   ALT (SGPT) U/L  --   --  9   AST (SGOT) U/L   --   --  15   GLUCOSE mg/dL 137*   < > 126*    < > = values in this interval not displayed.         Results from last 7 days   Lab Units 03/11/24  2316   ALBUMIN g/dL 3.0*     Results from last 7 days   Lab Units 03/14/24  0251 03/13/24  0347 03/12/24  0257 03/12/24  0152 03/11/24  2316   CK TOTAL U/L 51 55 81  --  145   HSTROP T ng/L  --   --   --  10 11                           Meds:   SCHEDULE  albuterol sulfate HFA, 2 puff, Inhalation, Q6H While Awake - RT  azaTHIOprine, 50 mg, Oral, BID  budesonide-formoterol, 2 puff, Inhalation, BID - RT  carvedilol, 25 mg, Oral, BID With Meals  cefepime, 2,000 mg, Intravenous, Once  cefepime, 2,000 mg, Intravenous, Q8H  dexAMETHasone, 6 mg, Oral, Daily With Breakfast  enoxaparin, 40 mg, Subcutaneous, Daily  guaiFENesin, 1,200 mg, Oral, Q12H  mycophenolate, 500 mg, Oral, Q12H  tiotropium bromide monohydrate, 2 puff, Inhalation, Daily - RT  vitamin D3, 5,000 Units, Oral, Daily      Infusions  Pharmacy to Dose Cefepime,       PRNs    albuterol    benzonatate    Calcium Replacement - Follow Nurse / BPA Driven Protocol    guaiFENesin    hydrALAZINE    ipratropium-albuterol    Magnesium Standard Dose Replacement - Follow Nurse / BPA Driven Protocol    nitroglycerin    Pharmacy to Dose Cefepime    Phosphorus Replacement - Follow Nurse / BPA Driven Protocol    Potassium Replacement - Follow Nurse / BPA Driven Protocol    sodium chloride    Physical Exam:  Physical Exam  Cardiovascular:      Heart sounds: Murmur heard.      No gallop.   Pulmonary:      Effort: No respiratory distress.      Breath sounds: No stridor. Rhonchi and rales present. No wheezing.   Chest:      Chest wall: No tenderness.         ROS  Review of Systems   Respiratory:  Positive for cough and shortness of breath. Negative for wheezing and stridor.    Cardiovascular:  Negative for chest pain, palpitations and leg swelling.             Total time spent with patient greater than: 45 Minutes

## 2024-03-15 NOTE — CASE MANAGEMENT/SOCIAL WORK
Continued Stay Note  JOHNNY Gonzalez     Patient Name: Dayne Goff  MRN: [id number]  Today's Date: 3/15/2024    Admit Date: 3/11/2024    Plan: Routine home with wife. Home O2 2L HS w/ [facility].   Discharge Plan       Row Name 03/15/24 1555       Plan    Plan Comments DC barriers: Covid(+), respiratory sputum(+) pseudomonas, abx changed to Cefepime.             Megan Naegele, RN     Office Phone: [phone]  Office Cell: [phone]

## 2024-03-16 ENCOUNTER — READMISSION MANAGEMENT (OUTPATIENT)
Dept: CALL CENTER | Facility: HOSPITAL | Age: 74
End: 2024-03-16
Payer: MEDICARE

## 2024-03-16 VITALS
BODY MASS INDEX: 22.19 KG/M2 | OXYGEN SATURATION: 92 % | SYSTOLIC BLOOD PRESSURE: 123 MMHG | TEMPERATURE: 97.8 F | HEART RATE: 75 BPM | HEIGHT: 70 IN | DIASTOLIC BLOOD PRESSURE: 70 MMHG | RESPIRATION RATE: 20 BRPM | WEIGHT: 155 LBS

## 2024-03-16 PROBLEM — D89.832 CYTOKINE RELEASE SYNDROME, GRADE 2: Status: ACTIVE | Noted: 2024-03-16

## 2024-03-16 PROBLEM — J15.1 PNEUMONIA DUE TO PSEUDOMONAS: Status: ACTIVE | Noted: 2024-03-16

## 2024-03-16 LAB
ANION GAP SERPL CALCULATED.3IONS-SCNC: 8 MMOL/L (ref 5–15)
BACTERIA SPEC AEROBE CULT: NORMAL
BASOPHILS # BLD AUTO: 0.05 10*3/MM3 (ref 0–0.2)
BASOPHILS NFR BLD AUTO: 0.3 % (ref 0–1.5)
BUN SERPL-MCNC: 19 MG/DL (ref 8–23)
BUN/CREAT SERPL: 30.2 (ref 7–25)
CALCIUM SPEC-SCNC: 8.7 MG/DL (ref 8.6–10.5)
CHLORIDE SERPL-SCNC: 95 MMOL/L (ref 98–107)
CO2 SERPL-SCNC: 29 MMOL/L (ref 22–29)
CREAT SERPL-MCNC: 0.63 MG/DL (ref 0.76–1.27)
DEPRECATED RDW RBC AUTO: 43.7 FL (ref 37–54)
EGFRCR SERPLBLD CKD-EPI 2021: 99.2 ML/MIN/1.73
EOSINOPHIL # BLD AUTO: 0 10*3/MM3 (ref 0–0.4)
EOSINOPHIL NFR BLD AUTO: 0 % (ref 0.3–6.2)
ERYTHROCYTE [DISTWIDTH] IN BLOOD BY AUTOMATED COUNT: 12.1 % (ref 12.3–15.4)
GLUCOSE SERPL-MCNC: 128 MG/DL (ref 65–99)
HCT VFR BLD AUTO: 40.3 % (ref 37.5–51)
HGB BLD-MCNC: 13 G/DL (ref 13–17.7)
IMM GRANULOCYTES # BLD AUTO: 0.28 10*3/MM3 (ref 0–0.05)
IMM GRANULOCYTES NFR BLD AUTO: 1.8 % (ref 0–0.5)
LYMPHOCYTES # BLD AUTO: 0.39 10*3/MM3 (ref 0.7–3.1)
LYMPHOCYTES NFR BLD AUTO: 2.5 % (ref 19.6–45.3)
MCH RBC QN AUTO: 31.6 PG (ref 26.6–33)
MCHC RBC AUTO-ENTMCNC: 32.3 G/DL (ref 31.5–35.7)
MCV RBC AUTO: 97.8 FL (ref 79–97)
MONOCYTES # BLD AUTO: 0.49 10*3/MM3 (ref 0.1–0.9)
MONOCYTES NFR BLD AUTO: 3.1 % (ref 5–12)
NEUTROPHILS NFR BLD AUTO: 14.45 10*3/MM3 (ref 1.7–7)
NEUTROPHILS NFR BLD AUTO: 92.3 % (ref 42.7–76)
NRBC BLD AUTO-RTO: 0 /100 WBC (ref 0–0.2)
PLATELET # BLD AUTO: 272 10*3/MM3 (ref 140–450)
PMV BLD AUTO: 10.1 FL (ref 6–12)
POTASSIUM SERPL-SCNC: 4.5 MMOL/L (ref 3.5–5.2)
RBC # BLD AUTO: 4.12 10*6/MM3 (ref 4.14–5.8)
SODIUM SERPL-SCNC: 132 MMOL/L (ref 136–145)
WBC NRBC COR # BLD AUTO: 15.66 10*3/MM3 (ref 3.4–10.8)

## 2024-03-16 PROCEDURE — 94799 UNLISTED PULMONARY SVC/PX: CPT

## 2024-03-16 PROCEDURE — 25010000002 HYDRALAZINE PER 20 MG: Performed by: HOSPITALIST

## 2024-03-16 PROCEDURE — 63710000001 AZATHIOPRINE PER 50 MG: Performed by: INTERNAL MEDICINE

## 2024-03-16 PROCEDURE — 94664 DEMO&/EVAL PT USE INHALER: CPT

## 2024-03-16 PROCEDURE — 80048 BASIC METABOLIC PNL TOTAL CA: CPT | Performed by: INTERNAL MEDICINE

## 2024-03-16 PROCEDURE — 63710000001 MYCOPHENOLATE MOFETIL PER 250 MG: Performed by: INTERNAL MEDICINE

## 2024-03-16 PROCEDURE — 25010000002 CEFEPIME PER 500 MG: Performed by: INTERNAL MEDICINE

## 2024-03-16 PROCEDURE — 85025 COMPLETE CBC W/AUTO DIFF WBC: CPT | Performed by: INTERNAL MEDICINE

## 2024-03-16 PROCEDURE — 94761 N-INVAS EAR/PLS OXIMETRY MLT: CPT

## 2024-03-16 RX ORDER — LEVOFLOXACIN 750 MG/1
750 TABLET, FILM COATED ORAL EVERY 24 HOURS
Qty: 4 TABLET | Refills: 0 | Status: SHIPPED | OUTPATIENT
Start: 2024-03-16 | End: 2024-03-20

## 2024-03-16 RX ORDER — DEXAMETHASONE 6 MG/1
6 TABLET ORAL
Qty: 1 TABLET | Refills: 0 | Status: SHIPPED | OUTPATIENT
Start: 2024-03-17 | End: 2024-03-16

## 2024-03-16 RX ORDER — DEXAMETHASONE 6 MG/1
6 TABLET ORAL
Qty: 1 TABLET | Refills: 0 | Status: SHIPPED | OUTPATIENT
Start: 2024-03-17 | End: 2024-03-18

## 2024-03-16 RX ORDER — GUAIFENESIN 600 MG/1
1200 TABLET, EXTENDED RELEASE ORAL EVERY 12 HOURS SCHEDULED
Qty: 20 TABLET | Refills: 0 | Status: SHIPPED | OUTPATIENT
Start: 2024-03-16

## 2024-03-16 RX ORDER — GUAIFENESIN 600 MG/1
1200 TABLET, EXTENDED RELEASE ORAL EVERY 12 HOURS SCHEDULED
Qty: 20 TABLET | Refills: 0 | Status: SHIPPED | OUTPATIENT
Start: 2024-03-16 | End: 2024-03-16

## 2024-03-16 RX ORDER — LEVOFLOXACIN 750 MG/1
750 TABLET, FILM COATED ORAL EVERY 24 HOURS
Qty: 4 TABLET | Refills: 0 | Status: SHIPPED | OUTPATIENT
Start: 2024-03-16 | End: 2024-03-16

## 2024-03-16 RX ORDER — LEVOFLOXACIN 750 MG/1
750 TABLET, FILM COATED ORAL EVERY 24 HOURS
Qty: 4 TABLET | Refills: 0 | Status: DISCONTINUED | OUTPATIENT
Start: 2024-03-16 | End: 2024-03-16 | Stop reason: HOSPADM

## 2024-03-16 RX ADMIN — CARVEDILOL 25 MG: 6.25 TABLET, FILM COATED ORAL at 08:22

## 2024-03-16 RX ADMIN — LEVOFLOXACIN 750 MG: 750 TABLET, FILM COATED ORAL at 11:51

## 2024-03-16 RX ADMIN — AZATHIOPRINE 50 MG: 50 TABLET ORAL at 09:15

## 2024-03-16 RX ADMIN — MYCOPHENOLATE MOFETIL 500 MG: 250 CAPSULE ORAL at 08:21

## 2024-03-16 RX ADMIN — HYDRALAZINE HYDROCHLORIDE 10 MG: 20 INJECTION INTRAMUSCULAR; INTRAVENOUS at 00:51

## 2024-03-16 RX ADMIN — CEFEPIME 2000 MG: 2 INJECTION, POWDER, FOR SOLUTION INTRAVENOUS at 08:21

## 2024-03-16 RX ADMIN — GUAIFENESIN 1200 MG: 600 TABLET, EXTENDED RELEASE ORAL at 08:22

## 2024-03-16 RX ADMIN — BUDESONIDE AND FORMOTEROL FUMARATE DIHYDRATE 2 PUFF: 160; 4.5 AEROSOL RESPIRATORY (INHALATION) at 07:35

## 2024-03-16 RX ADMIN — ACETAMINOPHEN 650 MG: 325 TABLET, FILM COATED ORAL at 04:47

## 2024-03-16 RX ADMIN — ALBUTEROL SULFATE 2 PUFF: 108 AEROSOL, METERED RESPIRATORY (INHALATION) at 11:11

## 2024-03-16 RX ADMIN — DEXAMETHASONE 6 MG: 6 TABLET ORAL at 08:22

## 2024-03-16 RX ADMIN — ALBUTEROL SULFATE 2 PUFF: 108 AEROSOL, METERED RESPIRATORY (INHALATION) at 07:40

## 2024-03-16 RX ADMIN — Medication 5000 UNITS: at 09:15

## 2024-03-16 RX ADMIN — CEFEPIME 2000 MG: 2 INJECTION, POWDER, FOR SOLUTION INTRAVENOUS at 00:51

## 2024-03-16 NOTE — PROGRESS NOTES
Daily Progress Note        COVID    COPD with acute exacerbation    Multifocal pneumonia    Acute on chronic respiratory failure with hypoxia    Assessment:    COVID-19 infection  Multifocal pneumonia  COPD no PFTs on chart  Chronic hypoxemia  Leukocytosis     Chest x-ray 3/11/2024 shows multifocal pneumonia, CT scan from 2018 shows severe emphysema and scarring in the lower lobes bilaterally.        Recommendations:    Antibiotics: Rocephin  Oxygen supplement and titration to maintain saturation 90 to 95%: Currently requiring 2 L per nasal cannula  Bronchodilators  Inhaled corticosteroids: Symbicort  Mucinex  Vitamin D  DVT prophylaxis    Need PET scan as an outpatient     I personally reviewed the radiological studies          LOS: 4 days     Subjective         Objective     Vital signs for last 24 hours:  Vitals:    03/16/24 0735 03/16/24 0738 03/16/24 0740 03/16/24 0747   BP:       Pulse: 64 62 64 63   Resp: 16 16 20 20   Temp:       TempSrc:       SpO2: 94% 94% 94% 94%   Weight:       Height:           Intake/Output last 3 shifts:  I/O last 3 completed shifts:  In: -   Out: 4550 [Urine:4550]  Intake/Output this shift:  I/O this shift:  In: 240 [P.O.:240]  Out: -       Radiology  Imaging Results (Last 24 Hours)       ** No results found for the last 24 hours. **            Labs:  Results from last 7 days   Lab Units 03/16/24  0452   WBC 10*3/mm3 15.66*   HEMOGLOBIN g/dL 13.0   HEMATOCRIT % 40.3   PLATELETS 10*3/mm3 272     Results from last 7 days   Lab Units 03/16/24  0452 03/12/24  0831 03/11/24  2316   SODIUM mmol/L 132*   < > 135*   POTASSIUM mmol/L 4.5   < > 4.2   CHLORIDE mmol/L 95*   < > 99   CO2 mmol/L 29.0   < > 27.0   BUN mg/dL 19   < > 26*   CREATININE mg/dL 0.63*   < > 0.96   CALCIUM mg/dL 8.7   < > 8.3*   BILIRUBIN mg/dL  --   --  0.5   ALK PHOS U/L  --   --  59   ALT (SGPT) U/L  --   --  9   AST (SGOT) U/L  --   --  15   GLUCOSE mg/dL 128*   < > 126*    < > = values in this interval not displayed.          Results from last 7 days   Lab Units 03/11/24  2316   ALBUMIN g/dL 3.0*     Results from last 7 days   Lab Units 03/14/24  0251 03/13/24  0347 03/12/24  0257 03/12/24  0152 03/11/24  2316   CK TOTAL U/L 51 55 81  --  145   HSTROP T ng/L  --   --   --  10 11                           Meds:   SCHEDULE  albuterol sulfate HFA, 2 puff, Inhalation, Q6H While Awake - RT  azaTHIOprine, 50 mg, Oral, BID  budesonide-formoterol, 2 puff, Inhalation, BID - RT  carvedilol, 25 mg, Oral, BID With Meals  dexAMETHasone, 6 mg, Oral, Daily With Breakfast  enoxaparin, 40 mg, Subcutaneous, Daily  guaiFENesin, 1,200 mg, Oral, Q12H  levoFLOXacin, 750 mg, Oral, Q24H  mycophenolate, 500 mg, Oral, Q12H  tiotropium bromide monohydrate, 2 puff, Inhalation, Daily - RT  vitamin D3, 5,000 Units, Oral, Daily      Infusions  Pharmacy to Dose Cefepime,       PRNs    acetaminophen    albuterol    benzonatate    Calcium Replacement - Follow Nurse / BPA Driven Protocol    guaiFENesin    hydrALAZINE    ipratropium-albuterol    Magnesium Standard Dose Replacement - Follow Nurse / BPA Driven Protocol    nitroglycerin    Pharmacy to Dose Cefepime    Phosphorus Replacement - Follow Nurse / BPA Driven Protocol    Potassium Replacement - Follow Nurse / BPA Driven Protocol    sodium chloride    Physical Exam:  Physical Exam  Cardiovascular:      Heart sounds: Murmur heard.      No gallop.   Pulmonary:      Effort: No respiratory distress.      Breath sounds: No stridor. Rhonchi and rales present. No wheezing.   Chest:      Chest wall: No tenderness.         ROS  Review of Systems   Respiratory:  Positive for cough and shortness of breath. Negative for wheezing and stridor.    Cardiovascular:  Negative for chest pain, palpitations and leg swelling.             Total time spent with patient greater than: 45 Minutes

## 2024-03-16 NOTE — PLAN OF CARE
Goal Outcome Evaluation:  Plan of Care Reviewed With: patient           Outcome Evaluation: pt with elevated blood pressure, PRN meds given; pt requested something for headache; remains on O2 at 2L per n/c; up with assist; continue to monitor

## 2024-03-16 NOTE — OUTREACH NOTE
Prep Survey      Flowsheet Row Responses   [facility] facility patient discharged from? [facility]   Is LACE score < 7 ? No   Eligibility Readm Mgmt   Discharge diagnosis COVID   Does the patient have one of the following disease processes/diagnoses(primary or secondary)? Other   Does the patient have Home health ordered? No   Is there a DME ordered? No   Prep survey completed? Yes            Vicky FITZGERALD - Registered Nurse

## 2024-03-16 NOTE — DISCHARGE SUMMARY
Hospitalist Service   DISCHARGE SUMMARY    Patient Name: Dayen Goff  : 1950  MRN: [id number]    Date of Admission: 3/11/2024  Date of Discharge:  24    Primary Care Physician: Missy Villalobos PA-C      Presenting Problem:   Multifocal pneumonia [J18.9]  COVID [U07.1]  COVID-19 [U07.1]    Active and Resolved Hospital Problems:  Active Hospital Problems    Diagnosis POA    **COVID [U07.1] Yes    Cytokine release syndrome, grade 2 [D89.832] No    Pneumonia due to Pseudomonas [J15.1] Yes    Acute on chronic respiratory failure with hypoxia [J96.21] Yes    Multifocal pneumonia [J18.9] Unknown    COPD with acute exacerbation [J44.1] Yes      Resolved Hospital Problems   No resolved problems to display.         Hospital Course     Hospital Course:  Dayne Goff is a 73 y.o. male with a CMH of COPD who presented to [facility] on 3/11/2024 with COVID, pneumonia.  Patient stated that he was getting out of bed and slipped on the hardwood floor and hit his head. Patient is unsure if he had LOC. Patient was too weak to get up off of the floor for at least 3 hours.  Spouse at bedside stated patient has fallen 3 times in the last month.  Patient developed a productive cough with yellow and green sputum, increased shortness of air, congestion times several days.  [facility] and he was started on azithromycin and steroids.  Did have pneumonia and COVID-positive on admission.  Started on supplemental oxygen, he does he wear 2 L supplemental oxygen chronically at home at night.  Started on steroids and antibiotics.  Pulmonology consulted.  Patient continued to have significant improvement, respiratory cultures positive for Pseudomonas, antibiotics switched to p.o. Levaquin.  Weaned down to home oxygen requirement and patient overall doing well.  PT/OT recommending home on discharge.  Pulmonology okay with discharge.  Patient is medically stable to discharge home with  follow-up with PCP and pulmonology as an outpatient.        DISCHARGE Follow Up Recommendations for labs and diagnostics:   Follow-up with PCP and pulmonology    Reasons For Change In Medications and Indications for New Medications:  As noted below    Day of Discharge     Vital Signs:  Temp:  [97 °F (36.1 °C)-97.9 °F (36.6 °C)] 97.5 °F (36.4 °C)  Heart Rate:  [62-79] 63  Resp:  [13-25] 20  BP: (134-168)/(82-96) 159/88  Flow (L/min):  [2] 2    Physical Exam:  General: Awake, alert, NAD  Eyes: PERRL, EOMI, conjunctivae are clear  Cardiovascular: Regular rate and rhythm, no murmurs  Respiratory: Clear to auscultation bilaterally, no wheezing or rales, unlabored breathing  Abdomen: Soft, nontender, positive bowel sounds, no guarding  Neurologic: A&O, CN grossly intact, moves all extremities spontaneously  Musculoskeletal: Normal range of motion, no other gross deformities  Skin: Warm, dry        Pertinent  and/or Most Recent Results     LAB RESULTS:      Lab 03/16/24  0452 03/15/24  0341 03/14/24  0251 03/13/24  0347 03/11/24  2320 03/11/24  2316   WBC 15.66* 15.38* 15.91* 12.10*  --  17.10*   HEMOGLOBIN 13.0 12.2* 11.1* 11.2*  --  11.9*   HEMATOCRIT 40.3 39.2 35.7* 34.1*  --  36.6*   PLATELETS 272 246 246 222  --  222   NEUTROS ABS 14.45* 13.94* 14.32* 11.30*  --  16.10*   IMMATURE GRANS (ABS) 0.28* 0.29*  --   --   --   --    LYMPHS ABS 0.39* 0.44*  --  0.40*  --  0.30*   MONOS ABS 0.49 0.68  --  0.50  --  0.70   EOS ABS 0.00 0.00  --  0.00  --  0.00   MCV 97.8* 100.0* 101.1* 96.5  --  97.6*   CRP  --  5.14* 7.92* 18.42*  --   --    PROCALCITONIN  --   --   --   --   --  1.80*   LACTATE  --   --   --   --  0.9  --          Lab 03/16/24  0452 03/14/24  0251 03/13/24  0347 03/12/24  0831 03/11/24  2316   SODIUM 132* 133* 137 134* 135*   POTASSIUM 4.5 4.7 4.6 4.1 4.2   CHLORIDE 95* 100 100 99 99   CO2 29.0 24.0 27.0 27.0 27.0   ANION GAP 8.0 9.0 10.0 8.0 9.0   BUN 19 29* 29* 23 26*   CREATININE 0.63* 0.78 0.80 0.74* 0.96    EGFR 99.2 93.0 92.3 94.5 82.4   GLUCOSE 128* 137* 140* 137* 126*   CALCIUM 8.7 8.4* 8.5* 8.2* 8.3*         Lab 03/11/24  2316   TOTAL PROTEIN 6.4   ALBUMIN 3.0*   GLOBULIN 3.4   ALT (SGPT) 9   AST (SGOT) 15   BILIRUBIN 0.5   ALK PHOS 59         Lab 03/12/24  0152 03/11/24  2316   PROBNP  --  872.3   HSTROP T 10 11                 Brief Urine Lab Results  (Last result in the past 365 days)        Color   Clarity   Blood   Leuk Est   Nitrite   Protein   CREAT   Urine HCG        03/12/24 0932 Ewa   Clear   Negative   Trace   Negative   30 mg/dL (1+)                 Microbiology Results (last 10 days)       Procedure Component Value - Date/Time    Respiratory Culture - Sputum, Cough [[id number]]  (Abnormal)  (Susceptibility) Collected: 03/12/24 1342    Lab Status: Final result Specimen: Sputum from Cough Updated: 03/15/24 1035     Respiratory Culture Scant growth (1+) Pseudomonas aeruginosa      Rare Normal Respiratory Gianna     Gram Stain Moderate (3+) WBCs per low power field      Rare (1+) Epithelial cells per low power field      Rare (1+) Mixed bacterial morphotypes seen on Gram Stain    Susceptibility        Pseudomonas aeruginosa      JOSE      Cefepime Susceptible      Ceftazidime Susceptible      Ciprofloxacin Susceptible      Levofloxacin Susceptible      Piperacillin + Tazobactam Susceptible      Tobramycin Susceptible                       Susceptibility Comments       Pseudomonas aeruginosa    With the exception of urinary-sourced infections, aminoglycosides should not be used as monotherapy.               Legionella Antigen, Urine - Urine, Urine, Clean Catch [[id number]]  (Normal) Collected: 03/12/24 0932    Lab Status: Final result Specimen: Urine, Clean Catch Updated: 03/12/24 1018     LEGIONELLA ANTIGEN, URINE Negative    S. Pneumo Ag Urine or CSF - Urine, Urine, Clean Catch [[id number]]  (Normal) Collected: 03/12/24 0932    Lab Status: Final result Specimen: Urine, Clean Catch Updated: 03/12/24 1018      Strep Pneumo Ag Negative    Blood Culture - Blood, Arm, Right [[id number]]  (Normal) Collected: 03/11/24 2354    Lab Status: Preliminary result Specimen: Blood from Arm, Right Updated: 03/16/24 0000     Blood Culture No growth at 4 days    Narrative:      Less than seven (7) mL's of blood was collected.  Insufficient quantity may yield false negative results.    Respiratory Panel PCR w/COVID-19(SARS-CoV-2) CLARISA/KUSUM/CK/PAD/COR/KRISTEN In-House, NP Swab in UTM/VTM, 2 HR TAT - Swab, Nasopharynx [[id number]]  (Abnormal) Collected: 03/11/24 2316    Lab Status: Final result Specimen: Swab from Nasopharynx Updated: 03/12/24 0017     ADENOVIRUS, PCR Not Detected     Coronavirus 229E Not Detected     Coronavirus HKU1 Not Detected     Coronavirus NL63 Not Detected     Coronavirus OC43 Not Detected     COVID19 Detected     Human Metapneumovirus Not Detected     Human Rhinovirus/Enterovirus Not Detected     Influenza A PCR Not Detected     Influenza B PCR Not Detected     Parainfluenza Virus 1 Not Detected     Parainfluenza Virus 2 Not Detected     Parainfluenza Virus 3 Not Detected     Parainfluenza Virus 4 Not Detected     RSV, PCR Not Detected     Bordetella pertussis pcr Not Detected     Bordetella parapertussis PCR Not Detected     Chlamydophila pneumoniae PCR Not Detected     Mycoplasma pneumo by PCR Not Detected    Narrative:      In the setting of a positive respiratory panel with a viral infection PLUS a negative procalcitonin without other underlying concern for bacterial infection, consider observing off antibiotics or discontinuation of antibiotics and continue supportive care. If the respiratory panel is positive for atypical bacterial infection (Bordetella pertussis, Chlamydophila pneumoniae, or Mycoplasma pneumoniae), consider antibiotic de-escalation to target atypical bacterial infection.    Blood Culture - Blood, Arm, Right [[id number]]  (Normal) Collected: 03/11/24 2316    Lab Status: Preliminary result Specimen:  Blood from Arm, Right Updated: 03/15/24 2330     Blood Culture No growth at 4 days            CT Head Without Contrast    Result Date: 3/12/2024  Impression: Impression: No acute intracranial process identified. Electronically Signed: Bibiana Abad MD  3/12/2024 1:49 AM EDT  Workstation ID: [id number]    XR Chest 1 View    Result Date: 3/11/2024  Impression: Impression: Multifocal pneumonia. Follow-up to resolution is recommended to exclude underlying malignancy. Electronically Signed: Bibiana Abad MD  3/11/2024 11:41 PM EDT  Workstation ID: [id number]                 Labs Pending at Discharge:  Pending Labs       Order Current Status    Blood Culture - Blood, Arm, Right Preliminary result    Blood Culture - Blood, Arm, Right Preliminary result            Procedures Performed           Consults:   Consults       Date and Time Order Name Status Description    3/12/2024  7:24 AM Inpatient Pulmonology Consult Completed     3/12/2024  2:00 AM Inpatient Hospitalist Consult                Discharge Details        Discharge Medications        New Medications        Instructions Start Date   dexAMETHasone 6 MG tablet  Commonly known as: DECADRON   6 mg, Oral, Daily With Breakfast   Start Date: March 17, 2024     guaiFENesin 600 MG 12 hr tablet  Commonly known as: MUCINEX   1,200 mg, Oral, Every 12 Hours Scheduled      levoFLOXacin 750 MG tablet  Commonly known as: LEVAQUIN   750 mg, Oral, Every 24 Hours      vitamin D3 125 MCG (5000 UT) capsule capsule   5,000 Units, Oral, Daily   Start Date: March 17, 2024            Continue These Medications        Instructions Start Date   albuterol sulfate  (90 Base) MCG/ACT inhaler  Commonly known as: PROVENTIL HFA;VENTOLIN HFA;PROAIR HFA   2 puffs, Inhalation, Every 4 Hours PRN      albuterol (2.5 MG/3ML) 0.083% nebulizer solution  Commonly known as: PROVENTIL   2.5 mg, Nebulization, Every 6 Hours PRN      azaTHIOprine 50 MG tablet  Commonly known as: IMURAN   50 mg, Oral, 2 Times  Daily      carvedilol 25 MG tablet  Commonly known as: COREG   25 mg, Oral, 2 Times Daily With Meals      denosumab 60 MG/ML solution prefilled syringe syringe  Commonly known as: PROLIA   Subcutaneous, Once, Every 6 months. To be given in nurse clinic on 3-13-24.      Fluticasone-Salmeterol 500-50 MCG/ACT DISKUS  Commonly known as: ADVAIR/WIXELA   Inhalation, 2 Times Daily - RT      leflunomide 20 MG tablet  Commonly known as: ARAVA   20 mg, Oral, Daily      mycophenolate 500 MG tablet  Commonly known as: CELLCEPT   Oral, 2 Times Daily      niacinamide 500 MG tablet   500 mg, Oral, 2 Times Daily With Meals      tiotropium bromide monohydrate 2.5 MCG/ACT aerosol solution inhaler  Commonly known as: SPIRIVA RESPIMAT   2 puffs, Inhalation, Daily - RT               Allergies   Allergen Reactions    Vancomycin Rash         Discharge Disposition:  Home or Self Care    Diet:  Hospital:  Diet Order   Procedures    Diet: Regular/House; Fluid Consistency: Thin (IDDSI 0)         Discharge Activity:         CODE STATUS:  Code Status and Medical Interventions:   Ordered at: 03/12/24 0215     Code Status (Patient has no pulse and is not breathing):    CPR (Attempt to Resuscitate)     Medical Interventions (Patient has pulse or is breathing):    Full Support         No future appointments.        Time spent on Discharge including face to face service:  40 minutes    Signature:    Electronically signed by Kym Solano DO, 03/16/24, 10:25 AM EDT.      Part of this note may be an electronic transcription/translation of spoken language to printed text using the Dragon Dictation System.

## 2024-03-17 LAB — BACTERIA SPEC AEROBE CULT: NORMAL

## 2024-03-20 ENCOUNTER — READMISSION MANAGEMENT (OUTPATIENT)
Dept: CALL CENTER | Facility: HOSPITAL | Age: 74
End: 2024-03-20
Payer: MEDICARE

## 2024-03-20 NOTE — OUTREACH NOTE
Medical Week 1 Survey      Flowsheet Row Responses   [facility] facility patient discharged from? [facility]   Does the patient have one of the following disease processes/diagnoses(primary or secondary)? Other   Week 1 attempt successful? No   Unsuccessful attempts Attempt 1  [All numbers listed were attempted-no answer]            Ewa H - Registered Nurse

## 2024-03-31 NOTE — PROGRESS NOTES
"Enter Query Response Below      Query Response: Unable to clinically determine              If applicable, please update the problem list.     Patient: Dayne Goff        : 1950  Account: [id number]           Admit Date: 3/11/2024        How to Respond to this query:       a. Click New Note     b. Answer query within the yellow box.                c. Update the Problem List, if applicable.      If you have any questions about this query contact me at: [email or web address]     Dr. Hussein,     Patient presented 3/11 following a fall. Notes include COVID-19, COPD exacerbation, and pseudomonas pneumonia. ED note includes \"stable, O2 sat 90% on room air, placed on oxygen, will give dexamethasone given mild hypoxemia in the setting of COVID.\" H&P notes \"he wears 2 L supplemental oxygen per NC at night at home.\" Pulmonology note on 3/13 includes chronic hypoxemia. Progress notes beginning 3/13 include acute on chronic hypoxic respiratory failure. Respiratory distress is not documented. Patient treated with monitoring, supplemental oxygen, steroids, and IV antibiotics.  Nursing flowsheet documentation includes:  3/11: SpO2 90 - 95% on room air - 3 L/min; RR 16 - 18  3/12: SpO2 88 - 96% on 2 L/min; RR 16 - 24  3/13: SpO2 91 - 97% on 2 L/min; RR 13 - 27  3/14: SpO2 90 - 97% on 2 L/min; RR 15 - 25    After study, was acute hypoxic respiratory failure clinically supported during this admission?     - Acute hypoxic respiratory failure was supported with additional clinical indicators ___________________  - Acute hypoxic respiratory failure was not supported  - Other ___________________  - Unable to clinically determine    By submitting this query, we are merely seeking further clarification of documentation to accurately reflect all conditions that you are monitoring, evaluating, treating or that extend the hospitalization or utilize additional resources of care. Please utilize your independent clinical " judgment when addressing the question(s) above.     This query and your response, once completed, will be entered into the legal medical record.    Sincerely,  Elmo Kramer RN, CDS  Clinical Documentation Integrity Program

## 2024-04-03 ENCOUNTER — READMISSION MANAGEMENT (OUTPATIENT)
Dept: CALL CENTER | Facility: HOSPITAL | Age: 74
End: 2024-04-03
Payer: MEDICARE

## 2024-04-03 NOTE — OUTREACH NOTE
Medical Week 3 Survey      Flowsheet Row Responses   [facility] facility patient discharged from? [facility]   Does the patient have one of the following disease processes/diagnoses(primary or secondary)? Other   Week 3 attempt successful? No   Unsuccessful attempts Attempt 1            Livier POSEY - Licensed Nurse

## 2024-04-08 ENCOUNTER — READMISSION MANAGEMENT (OUTPATIENT)
Dept: CALL CENTER | Facility: HOSPITAL | Age: 74
End: 2024-04-08
Payer: MEDICARE

## 2024-04-08 NOTE — OUTREACH NOTE
Medical Week 3 Survey      Flowsheet Row Responses   [facility] facility patient discharged from? [facility]   Does the patient have one of the following disease processes/diagnoses(primary or secondary)? Other   Week 3 attempt successful? No   Unsuccessful attempts Attempt 2            Yana STEINBERG - Registered Nurse